# Patient Record
Sex: FEMALE | Race: ASIAN | NOT HISPANIC OR LATINO | URBAN - METROPOLITAN AREA
[De-identification: names, ages, dates, MRNs, and addresses within clinical notes are randomized per-mention and may not be internally consistent; named-entity substitution may affect disease eponyms.]

---

## 2018-11-14 ENCOUNTER — INPATIENT (INPATIENT)
Facility: HOSPITAL | Age: 73
LOS: 4 days | Discharge: HOME | End: 2018-11-19
Attending: INTERNAL MEDICINE | Admitting: INTERNAL MEDICINE
Payer: MEDICARE

## 2018-11-14 VITALS
HEART RATE: 98 BPM | RESPIRATION RATE: 18 BRPM | TEMPERATURE: 98 F | WEIGHT: 99.21 LBS | SYSTOLIC BLOOD PRESSURE: 151 MMHG | DIASTOLIC BLOOD PRESSURE: 97 MMHG | OXYGEN SATURATION: 90 %

## 2018-11-14 DIAGNOSIS — I50.811 ACUTE RIGHT HEART FAILURE: ICD-10-CM

## 2018-11-14 DIAGNOSIS — I21.4 NON-ST ELEVATION (NSTEMI) MYOCARDIAL INFARCTION: ICD-10-CM

## 2018-11-14 DIAGNOSIS — R63.4 ABNORMAL WEIGHT LOSS: ICD-10-CM

## 2018-11-14 DIAGNOSIS — E78.2 MIXED HYPERLIPIDEMIA: ICD-10-CM

## 2018-11-14 DIAGNOSIS — E03.9 HYPOTHYROIDISM, UNSPECIFIED: ICD-10-CM

## 2018-11-14 DIAGNOSIS — I10 ESSENTIAL (PRIMARY) HYPERTENSION: ICD-10-CM

## 2018-11-14 DIAGNOSIS — Z29.9 ENCOUNTER FOR PROPHYLACTIC MEASURES, UNSPECIFIED: ICD-10-CM

## 2018-11-14 LAB
ALBUMIN SERPL ELPH-MCNC: 2.8 G/DL — LOW (ref 3.3–5)
ALP SERPL-CCNC: 151 U/L — HIGH (ref 40–120)
ALT FLD-CCNC: 34 U/L — HIGH (ref 4–33)
APTT BLD: 34.4 SEC — SIGNIFICANT CHANGE UP (ref 27.5–36.3)
APTT BLD: 58.4 SEC — HIGH (ref 27.5–36.3)
AST SERPL-CCNC: 50 U/L — HIGH (ref 4–32)
BASE EXCESS BLDV CALC-SCNC: -1.4 MMOL/L — SIGNIFICANT CHANGE UP
BASE EXCESS BLDV CALC-SCNC: 0 MMOL/L — SIGNIFICANT CHANGE UP
BASOPHILS # BLD AUTO: 0.02 K/UL — SIGNIFICANT CHANGE UP (ref 0–0.2)
BASOPHILS NFR BLD AUTO: 0.3 % — SIGNIFICANT CHANGE UP (ref 0–2)
BILIRUB SERPL-MCNC: 0.4 MG/DL — SIGNIFICANT CHANGE UP (ref 0.2–1.2)
BLOOD GAS VENOUS - CREATININE: 0.65 MG/DL — SIGNIFICANT CHANGE UP (ref 0.5–1.3)
BLOOD GAS VENOUS - CREATININE: 0.69 MG/DL — SIGNIFICANT CHANGE UP (ref 0.5–1.3)
BUN SERPL-MCNC: 26 MG/DL — HIGH (ref 7–23)
CALCIUM SERPL-MCNC: 8.8 MG/DL — SIGNIFICANT CHANGE UP (ref 8.4–10.5)
CHLORIDE BLDV-SCNC: 108 MMOL/L — SIGNIFICANT CHANGE UP (ref 96–108)
CHLORIDE BLDV-SCNC: 109 MMOL/L — HIGH (ref 96–108)
CHLORIDE SERPL-SCNC: 101 MMOL/L — SIGNIFICANT CHANGE UP (ref 98–107)
CO2 SERPL-SCNC: 21 MMOL/L — LOW (ref 22–31)
CREAT SERPL-MCNC: 0.77 MG/DL — SIGNIFICANT CHANGE UP (ref 0.5–1.3)
D DIMER BLD IA.RAPID-MCNC: 3347 NG/ML — SIGNIFICANT CHANGE UP
EOSINOPHIL # BLD AUTO: 0.06 K/UL — SIGNIFICANT CHANGE UP (ref 0–0.5)
EOSINOPHIL NFR BLD AUTO: 0.8 % — SIGNIFICANT CHANGE UP (ref 0–6)
GAS PNL BLDV: 128 MMOL/L — LOW (ref 136–146)
GAS PNL BLDV: 130 MMOL/L — LOW (ref 136–146)
GLUCOSE BLDV-MCNC: 70 — SIGNIFICANT CHANGE UP (ref 70–99)
GLUCOSE BLDV-MCNC: 81 — SIGNIFICANT CHANGE UP (ref 70–99)
GLUCOSE SERPL-MCNC: 72 MG/DL — SIGNIFICANT CHANGE UP (ref 70–99)
HCO3 BLDV-SCNC: 22 MMOL/L — SIGNIFICANT CHANGE UP (ref 20–27)
HCO3 BLDV-SCNC: 24 MMOL/L — SIGNIFICANT CHANGE UP (ref 20–27)
HCT VFR BLD CALC: 35.6 % — SIGNIFICANT CHANGE UP (ref 34.5–45)
HCT VFR BLD CALC: 36.1 % — SIGNIFICANT CHANGE UP (ref 34.5–45)
HCT VFR BLDV CALC: 32.6 % — LOW (ref 34.5–45)
HCT VFR BLDV CALC: 35.4 % — SIGNIFICANT CHANGE UP (ref 34.5–45)
HGB BLD-MCNC: 11 G/DL — LOW (ref 11.5–15.5)
HGB BLD-MCNC: 11 G/DL — LOW (ref 11.5–15.5)
HGB BLDV-MCNC: 10.5 G/DL — LOW (ref 11.5–15.5)
HGB BLDV-MCNC: 11.5 G/DL — SIGNIFICANT CHANGE UP (ref 11.5–15.5)
IMM GRANULOCYTES # BLD AUTO: 0.03 # — SIGNIFICANT CHANGE UP
IMM GRANULOCYTES NFR BLD AUTO: 0.4 % — SIGNIFICANT CHANGE UP (ref 0–1.5)
INR BLD: 1.12 — SIGNIFICANT CHANGE UP (ref 0.88–1.17)
INR BLD: 1.18 — HIGH (ref 0.88–1.17)
LACTATE BLDV-MCNC: 1.6 MMOL/L — SIGNIFICANT CHANGE UP (ref 0.5–2)
LACTATE BLDV-MCNC: 3.7 MMOL/L — HIGH (ref 0.5–2)
LYMPHOCYTES # BLD AUTO: 1.52 K/UL — SIGNIFICANT CHANGE UP (ref 1–3.3)
LYMPHOCYTES # BLD AUTO: 20.1 % — SIGNIFICANT CHANGE UP (ref 13–44)
MCHC RBC-ENTMCNC: 26.6 PG — LOW (ref 27–34)
MCHC RBC-ENTMCNC: 27 PG — SIGNIFICANT CHANGE UP (ref 27–34)
MCHC RBC-ENTMCNC: 30.5 % — LOW (ref 32–36)
MCHC RBC-ENTMCNC: 30.9 % — LOW (ref 32–36)
MCV RBC AUTO: 86.2 FL — SIGNIFICANT CHANGE UP (ref 80–100)
MCV RBC AUTO: 88.7 FL — SIGNIFICANT CHANGE UP (ref 80–100)
MONOCYTES # BLD AUTO: 0.74 K/UL — SIGNIFICANT CHANGE UP (ref 0–0.9)
MONOCYTES NFR BLD AUTO: 9.8 % — SIGNIFICANT CHANGE UP (ref 2–14)
NEUTROPHILS # BLD AUTO: 5.19 K/UL — SIGNIFICANT CHANGE UP (ref 1.8–7.4)
NEUTROPHILS NFR BLD AUTO: 68.6 % — SIGNIFICANT CHANGE UP (ref 43–77)
NRBC # FLD: 0 — SIGNIFICANT CHANGE UP
NRBC # FLD: 0 — SIGNIFICANT CHANGE UP
NT-PROBNP SERPL-SCNC: SIGNIFICANT CHANGE UP PG/ML
PCO2 BLDV: 39 MMHG — LOW (ref 41–51)
PCO2 BLDV: 45 MMHG — SIGNIFICANT CHANGE UP (ref 41–51)
PH BLDV: 7.34 PH — SIGNIFICANT CHANGE UP (ref 7.32–7.43)
PH BLDV: 7.41 PH — SIGNIFICANT CHANGE UP (ref 7.32–7.43)
PLATELET # BLD AUTO: 247 K/UL — SIGNIFICANT CHANGE UP (ref 150–400)
PLATELET # BLD AUTO: 253 K/UL — SIGNIFICANT CHANGE UP (ref 150–400)
PMV BLD: 11.6 FL — SIGNIFICANT CHANGE UP (ref 7–13)
PMV BLD: 12 FL — SIGNIFICANT CHANGE UP (ref 7–13)
PO2 BLDV: 24 MMHG — LOW (ref 35–40)
PO2 BLDV: 28 MMHG — LOW (ref 35–40)
POTASSIUM BLDV-SCNC: 4.2 MMOL/L — SIGNIFICANT CHANGE UP (ref 3.4–4.5)
POTASSIUM BLDV-SCNC: 5.8 MMOL/L — HIGH (ref 3.4–4.5)
POTASSIUM SERPL-MCNC: 4.3 MMOL/L — SIGNIFICANT CHANGE UP (ref 3.5–5.3)
POTASSIUM SERPL-SCNC: 4.3 MMOL/L — SIGNIFICANT CHANGE UP (ref 3.5–5.3)
PROT SERPL-MCNC: 9.5 G/DL — HIGH (ref 6–8.3)
PROTHROM AB SERPL-ACNC: 12.8 SEC — SIGNIFICANT CHANGE UP (ref 9.8–13.1)
PROTHROM AB SERPL-ACNC: 13.2 SEC — HIGH (ref 9.8–13.1)
RBC # BLD: 4.07 M/UL — SIGNIFICANT CHANGE UP (ref 3.8–5.2)
RBC # BLD: 4.13 M/UL — SIGNIFICANT CHANGE UP (ref 3.8–5.2)
RBC # FLD: 16 % — HIGH (ref 10.3–14.5)
RBC # FLD: 16.1 % — HIGH (ref 10.3–14.5)
SAO2 % BLDV: 25.2 % — LOW (ref 60–85)
SAO2 % BLDV: 39.6 % — LOW (ref 60–85)
SODIUM SERPL-SCNC: 132 MMOL/L — LOW (ref 135–145)
TROPONIN T, HIGH SENSITIVITY: 191 NG/L — CRITICAL HIGH (ref ?–14)
TROPONIN T, HIGH SENSITIVITY: 212 NG/L — CRITICAL HIGH (ref ?–14)
WBC # BLD: 5.83 K/UL — SIGNIFICANT CHANGE UP (ref 3.8–10.5)
WBC # BLD: 7.56 K/UL — SIGNIFICANT CHANGE UP (ref 3.8–10.5)
WBC # FLD AUTO: 5.83 K/UL — SIGNIFICANT CHANGE UP (ref 3.8–10.5)
WBC # FLD AUTO: 7.56 K/UL — SIGNIFICANT CHANGE UP (ref 3.8–10.5)

## 2018-11-14 PROCEDURE — 71275 CT ANGIOGRAPHY CHEST: CPT | Mod: 26

## 2018-11-14 PROCEDURE — 71046 X-RAY EXAM CHEST 2 VIEWS: CPT | Mod: 26

## 2018-11-14 RX ORDER — METOPROLOL TARTRATE 50 MG
25 TABLET ORAL DAILY
Qty: 0 | Refills: 0 | Status: DISCONTINUED | OUTPATIENT
Start: 2018-11-14 | End: 2018-11-14

## 2018-11-14 RX ORDER — CLOPIDOGREL BISULFATE 75 MG/1
75 TABLET, FILM COATED ORAL DAILY
Qty: 0 | Refills: 0 | Status: DISCONTINUED | OUTPATIENT
Start: 2018-11-14 | End: 2018-11-19

## 2018-11-14 RX ORDER — LEVOTHYROXINE SODIUM 125 MCG
137 TABLET ORAL DAILY
Qty: 0 | Refills: 0 | Status: DISCONTINUED | OUTPATIENT
Start: 2018-11-14 | End: 2018-11-19

## 2018-11-14 RX ORDER — ASPIRIN/CALCIUM CARB/MAGNESIUM 324 MG
325 TABLET ORAL ONCE
Qty: 0 | Refills: 0 | Status: COMPLETED | OUTPATIENT
Start: 2018-11-14 | End: 2018-11-14

## 2018-11-14 RX ORDER — FUROSEMIDE 40 MG
20 TABLET ORAL EVERY 12 HOURS
Qty: 0 | Refills: 0 | Status: DISCONTINUED | OUTPATIENT
Start: 2018-11-14 | End: 2018-11-15

## 2018-11-14 RX ORDER — FERROUS GLUCONATE 100 %
1 POWDER (GRAM) MISCELLANEOUS
Qty: 0 | Refills: 0 | COMMUNITY

## 2018-11-14 RX ORDER — HEPARIN SODIUM 5000 [USP'U]/ML
2700 INJECTION INTRAVENOUS; SUBCUTANEOUS EVERY 6 HOURS
Qty: 0 | Refills: 0 | Status: DISCONTINUED | OUTPATIENT
Start: 2018-11-14 | End: 2018-11-15

## 2018-11-14 RX ORDER — METOPROLOL TARTRATE 50 MG
25 TABLET ORAL
Qty: 0 | Refills: 0 | Status: DISCONTINUED | OUTPATIENT
Start: 2018-11-14 | End: 2018-11-19

## 2018-11-14 RX ORDER — HEPARIN SODIUM 5000 [USP'U]/ML
INJECTION INTRAVENOUS; SUBCUTANEOUS
Qty: 25000 | Refills: 0 | Status: DISCONTINUED | OUTPATIENT
Start: 2018-11-14 | End: 2018-11-15

## 2018-11-14 RX ORDER — SIMVASTATIN 20 MG/1
40 TABLET, FILM COATED ORAL AT BEDTIME
Qty: 0 | Refills: 0 | Status: DISCONTINUED | OUTPATIENT
Start: 2018-11-14 | End: 2018-11-19

## 2018-11-14 RX ORDER — ASPIRIN/CALCIUM CARB/MAGNESIUM 324 MG
81 TABLET ORAL DAILY
Qty: 0 | Refills: 0 | Status: DISCONTINUED | OUTPATIENT
Start: 2018-11-14 | End: 2018-11-19

## 2018-11-14 RX ORDER — METOPROLOL TARTRATE 50 MG
50 TABLET ORAL
Qty: 0 | Refills: 0 | Status: DISCONTINUED | OUTPATIENT
Start: 2018-11-14 | End: 2018-11-14

## 2018-11-14 RX ORDER — HEPARIN SODIUM 5000 [USP'U]/ML
2700 INJECTION INTRAVENOUS; SUBCUTANEOUS ONCE
Qty: 0 | Refills: 0 | Status: COMPLETED | OUTPATIENT
Start: 2018-11-14 | End: 2018-11-14

## 2018-11-14 RX ORDER — FERROUS SULFATE 325(65) MG
325 TABLET ORAL THREE TIMES A DAY
Qty: 0 | Refills: 0 | Status: DISCONTINUED | OUTPATIENT
Start: 2018-11-14 | End: 2018-11-19

## 2018-11-14 RX ADMIN — HEPARIN SODIUM 500 UNIT(S)/HR: 5000 INJECTION INTRAVENOUS; SUBCUTANEOUS at 22:01

## 2018-11-14 RX ADMIN — HEPARIN SODIUM 2700 UNIT(S): 5000 INJECTION INTRAVENOUS; SUBCUTANEOUS at 15:07

## 2018-11-14 RX ADMIN — Medication 325 MILLIGRAM(S): at 23:42

## 2018-11-14 RX ADMIN — Medication 20 MILLIGRAM(S): at 23:42

## 2018-11-14 RX ADMIN — HEPARIN SODIUM 500 UNIT(S)/HR: 5000 INJECTION INTRAVENOUS; SUBCUTANEOUS at 15:06

## 2018-11-14 RX ADMIN — Medication 325 MILLIGRAM(S): at 15:05

## 2018-11-14 RX ADMIN — SIMVASTATIN 40 MILLIGRAM(S): 20 TABLET, FILM COATED ORAL at 23:42

## 2018-11-14 NOTE — H&P ADULT - PMH
CAD (coronary artery disease)    HTN (hypertension)    Hypothyroidism, unspecified type    Mixed hyperlipidemia

## 2018-11-14 NOTE — ED ADULT NURSE REASSESSMENT NOTE - NS ED NURSE REASSESS COMMENT FT1
Repeat labs drawn and sent by Lorene MCCORMICK.  Pt vitals as noted.  Pt reports 0/10 pain.  Pt NSR on cardiac monitor.  Pt transported upstairs.

## 2018-11-14 NOTE — ED PROVIDER NOTE - CARE PLAN
Principal Discharge DX:	NSTEMI (non-ST elevated myocardial infarction)  Secondary Diagnosis:	Right heart failure

## 2018-11-14 NOTE — ED ADULT NURSE NOTE - OBJECTIVE STATEMENT
Patient presented to ED A&O x4, with c/o SOB, decreased PO intake with weight loss 0f 30 lbs in 6 months. (+) CP/epigastric pain 1 week ago. Patient is s/p cardiac stent 2 weeks ago. EKG done and cardiac monitor in place.  Blood work drawn and sent to lab. ER physician evaluated patient.  Will continue to monitor patient closely. Hill NICOLE

## 2018-11-14 NOTE — ED PROVIDER NOTE - OBJECTIVE STATEMENT
73 F with hx of stent placement x 1 1month ago, ? thyroid disease, and HTN presents to ED with chest pain x 3 days that is midline, substernal , intermittent and worse on exertion. CP is associated with shortness of breath since this morning prompting ED visit. She states that she has had dry cough x a few days and 35 lb weight loss over last 11 months for which she is being evaluated as an outpatient .Per her PMD , it was suspected to be due to thyroid disease so was started on ? thyroid medicine and had an ultrasound of her abdomen performed. She denies hx of dvt, PE. Admits to sensation of difficulty swallowing

## 2018-11-14 NOTE — H&P ADULT - HISTORY OF PRESENT ILLNESS
72 y/o F w/ PMH of HTN, Hypothyroidism, HLD, and CAD (s/p stent x2 10/5/18) presenting w/ worsening chest pain x3 days. Patient states she had a holter monitor placed by her doctor and she was going to return the monitor today (11/14) when she suddenly started getting chest pain and dizziness. She states the chest pain was 8/10, nonradiating, and it was a sharp pain. She also had some shortness of breath and cough but she was more affected by the dizziness than she was by the dyspnea or cough. She denies any fever, chills, recent illness, sputum production, diaphoresis, N/V/D, hematuria, changes in bowel habits, or peripheral edema. Pt also reports a 35lb weight loss over 11 months for which she was evaluated for outpatient. She thinks it has something to do with her thyroid, however, she was just started on levothyroxine 1 month ago and is not aware of any further workups she has had.

## 2018-11-14 NOTE — ED ADULT NURSE NOTE - NSIMPLEMENTINTERV_GEN_ALL_ED
Implemented All Fall with Harm Risk Interventions:  [place] to call system. Call bell, personal items and telephone within reach. Instruct patient to call for assistance. Room bathroom lighting operational. Non-slip footwear when patient is off stretcher. Physically safe environment: no spills, clutter or unnecessary equipment. Stretcher in lowest position, wheels locked, appropriate side rails in place. Provide visual cue, wrist band, yellow gown, etc. Monitor gait and stability. Monitor for mental status changes and reorient to person, place, and time. Review medications for side effects contributing to fall risk. Reinforce activity limits and safety measures with patient and family. Provide visual clues: red socks.

## 2018-11-14 NOTE — H&P ADULT - ATTENDING COMMENTS
Patient seen and examined.  Agree with above.   -admitted with NSTEMI  -currently chest pain free  -continue with hep gtt  -cath today    Carolina Ortega MD

## 2018-11-14 NOTE — H&P ADULT - PROBLEM SELECTOR PLAN 1
loading dose  Heparin gtt  Admit to telemetry, serial cardiac enzymes, serial EKGs  Check CBC, CMP, TSH, FLP, HgA1C  Echo ordered  F/U MD note

## 2018-11-14 NOTE — H&P ADULT - ASSESSMENT
74 y/o F w/ PMH of HTN, Hypothyroidism, HLD, and CAD (s/p stent x2 10/5/18) presenting w/ worsening chest pain x3 days. Pt also reports a 35lb weight loss over 11 months.    +NSTEMI on heparin gtt  +CHF on IV lasix 20  +35lb Wt loss

## 2018-11-14 NOTE — ED PROVIDER NOTE - ATTENDING CONTRIBUTION TO CARE
I agree with the above H&P.  Briefly this is a 73 year old with recent stant placement presents with sob and chest pain X 4 days. pain is exertional.  concern for acs vs in stent rethrombosis vs pna vs pe. will checj labs ekg and trops.  ddimer and cta if positive. likely admit

## 2018-11-14 NOTE — H&P ADULT - NSHPLABSRESULTS_GEN_ALL_CORE
11.0   5.83  )-----------( 247      ( 14 Nov 2018 20:47 )             35.6   11-14    132<L>  |  101  |  26<H>  ----------------------------<  72  4.3   |  21<L>  |  0.77    Ca    8.8      14 Nov 2018 12:45    TPro  9.5<H>  /  Alb  2.8<L>  /  TBili  0.4  /  DBili  x   /  AST  50<H>  /  ALT  34<H>  /  AlkPhos  151<H>  11-14    Trop 191-->212  BNP-->31901  DDimer: [id number]  CTA: No evidence of PE; 6mm R upper lobe nodule; severely dilated R atrium w/ evidence of R sided heart failure; mild pulmonary edema  CXR: Small left pleural effusion with adjacent left basilar patchy airspace consolidation including possible infiltrate/pneumonia in the proper clinical context. Sharp right CP angle. Clear remaining visualized lungs. No pneumothorax. Enlarged appearing cardiac and mediastinal silhouettes. Scant aortic arch calcifications. Trachea midline. Slightly osteopenic but otherwise unremarkable osseous structures. 11.0   5.83  )-----------( 247      ( 14 Nov 2018 20:47 )             35.6   11-14    132<L>  |  101  |  26<H>  ----------------------------<  72  4.3   |  21<L>  |  0.77    Ca    8.8      14 Nov 2018 12:45    TPro  9.5<H>  /  Alb  2.8<L>  /  TBili  0.4  /  DBili  x   /  AST  50<H>  /  ALT  34<H>  /  AlkPhos  151<H>  11-14    Trop 191-->212  BNP-->89813  DDimer: [id number]  EKG: Sinus @91 BPM w/ TWI III, avf, V3-V6  CTA: No evidence of PE; 6mm R upper lobe nodule; severely dilated R atrium w/ evidence of R sided heart failure; mild pulmonary edema  CXR: Small left pleural effusion with adjacent left basilar patchy airspace consolidation including possible infiltrate/pneumonia in the proper clinical context. Sharp right CP angle. Clear remaining visualized lungs. No pneumothorax. Enlarged appearing cardiac and mediastinal silhouettes. Scant aortic arch calcifications. Trachea midline. Slightly osteopenic but otherwise unremarkable osseous structures.

## 2018-11-14 NOTE — ED ADULT TRIAGE NOTE - CHIEF COMPLAINT QUOTE
Pt c/o SOB since yesterday that has worsened with CP and dizziness that worsens with exertion. Pt had 2 stents placed in October 5th. Appears SOB.

## 2018-11-14 NOTE — ED PROVIDER NOTE - MEDICAL DECISION MAKING DETAILS
72 y/o F with HTN, HLD, CAD presenting with exertional dyspnea and chest pain with some difficulty swallowing intermittently. Considering ACS vs PE vs effusion vs malignancy vs mass . will obtain ECG, blood work and CT chest. likely admit tele

## 2018-11-15 LAB
ALBUMIN SERPL ELPH-MCNC: 2.8 G/DL — LOW (ref 3.3–5)
ALP SERPL-CCNC: 149 U/L — HIGH (ref 40–120)
ALT FLD-CCNC: 33 U/L — SIGNIFICANT CHANGE UP (ref 4–33)
APTT BLD: 49.2 SEC — HIGH (ref 27.5–36.3)
APTT BLD: 65.2 SEC — HIGH (ref 27.5–36.3)
AST SERPL-CCNC: 50 U/L — HIGH (ref 4–32)
BILIRUB SERPL-MCNC: 0.5 MG/DL — SIGNIFICANT CHANGE UP (ref 0.2–1.2)
BUN SERPL-MCNC: 32 MG/DL — HIGH (ref 7–23)
CALCIUM SERPL-MCNC: 8.7 MG/DL — SIGNIFICANT CHANGE UP (ref 8.4–10.5)
CHLORIDE SERPL-SCNC: 101 MMOL/L — SIGNIFICANT CHANGE UP (ref 98–107)
CHOLEST SERPL-MCNC: 100 MG/DL — LOW (ref 120–199)
CK MB BLD-MCNC: 14.5 NG/ML — HIGH (ref 1–4.7)
CK MB BLD-MCNC: 18.83 NG/ML — HIGH (ref 1–4.7)
CK MB BLD-MCNC: 8 — HIGH (ref 0–2.5)
CK MB BLD-MCNC: 8.9 — HIGH (ref 0–2.5)
CK SERPL-CCNC: 181 U/L — HIGH (ref 25–170)
CK SERPL-CCNC: 211 U/L — HIGH (ref 25–170)
CO2 SERPL-SCNC: 20 MMOL/L — LOW (ref 22–31)
CREAT SERPL-MCNC: 0.94 MG/DL — SIGNIFICANT CHANGE UP (ref 0.5–1.3)
GLUCOSE SERPL-MCNC: 93 MG/DL — SIGNIFICANT CHANGE UP (ref 70–99)
HBA1C BLD-MCNC: 5.8 % — HIGH (ref 4–5.6)
HCT VFR BLD CALC: 37 % — SIGNIFICANT CHANGE UP (ref 34.5–45)
HDLC SERPL-MCNC: 28 MG/DL — LOW (ref 45–65)
HGB BLD-MCNC: 11.6 G/DL — SIGNIFICANT CHANGE UP (ref 11.5–15.5)
INR BLD: 1.18 — HIGH (ref 0.88–1.17)
LIPID PNL WITH DIRECT LDL SERPL: 62 MG/DL — SIGNIFICANT CHANGE UP
MAGNESIUM SERPL-MCNC: 1.8 MG/DL — SIGNIFICANT CHANGE UP (ref 1.6–2.6)
MCHC RBC-ENTMCNC: 26.8 PG — LOW (ref 27–34)
MCHC RBC-ENTMCNC: 31.4 % — LOW (ref 32–36)
MCV RBC AUTO: 85.5 FL — SIGNIFICANT CHANGE UP (ref 80–100)
NRBC # FLD: 0 — SIGNIFICANT CHANGE UP
PLATELET # BLD AUTO: 264 K/UL — SIGNIFICANT CHANGE UP (ref 150–400)
PMV BLD: 12.1 FL — SIGNIFICANT CHANGE UP (ref 7–13)
POTASSIUM SERPL-MCNC: 4.4 MMOL/L — SIGNIFICANT CHANGE UP (ref 3.5–5.3)
POTASSIUM SERPL-SCNC: 4.4 MMOL/L — SIGNIFICANT CHANGE UP (ref 3.5–5.3)
PROT SERPL-MCNC: 9.3 G/DL — HIGH (ref 6–8.3)
PROTHROM AB SERPL-ACNC: 13.5 SEC — HIGH (ref 9.8–13.1)
RBC # BLD: 4.33 M/UL — SIGNIFICANT CHANGE UP (ref 3.8–5.2)
RBC # FLD: 15.9 % — HIGH (ref 10.3–14.5)
SODIUM SERPL-SCNC: 133 MMOL/L — LOW (ref 135–145)
TRIGL SERPL-MCNC: 95 MG/DL — SIGNIFICANT CHANGE UP (ref 10–149)
TROPONIN T, HIGH SENSITIVITY: 246 NG/L — CRITICAL HIGH (ref ?–14)
TSH SERPL-MCNC: 1.41 UIU/ML — SIGNIFICANT CHANGE UP (ref 0.27–4.2)
WBC # BLD: 5.81 K/UL — SIGNIFICANT CHANGE UP (ref 3.8–10.5)
WBC # FLD AUTO: 5.81 K/UL — SIGNIFICANT CHANGE UP (ref 3.8–10.5)

## 2018-11-15 PROCEDURE — 93306 TTE W/DOPPLER COMPLETE: CPT | Mod: 26

## 2018-11-15 PROCEDURE — 93458 L HRT ARTERY/VENTRICLE ANGIO: CPT | Mod: 26

## 2018-11-15 RX ORDER — INFLUENZA VIRUS VACCINE 15; 15; 15; 15 UG/.5ML; UG/.5ML; UG/.5ML; UG/.5ML
0.5 SUSPENSION INTRAMUSCULAR ONCE
Qty: 0 | Refills: 0 | Status: DISCONTINUED | OUTPATIENT
Start: 2018-11-15 | End: 2018-11-19

## 2018-11-15 RX ORDER — BENZOCAINE AND MENTHOL 5; 1 G/100ML; G/100ML
1 LIQUID ORAL
Qty: 0 | Refills: 0 | Status: DISCONTINUED | OUTPATIENT
Start: 2018-11-15 | End: 2018-11-19

## 2018-11-15 RX ADMIN — Medication 81 MILLIGRAM(S): at 14:33

## 2018-11-15 RX ADMIN — Medication 325 MILLIGRAM(S): at 14:33

## 2018-11-15 RX ADMIN — HEPARIN SODIUM 600 UNIT(S)/HR: 5000 INJECTION INTRAVENOUS; SUBCUTANEOUS at 10:33

## 2018-11-15 RX ADMIN — SIMVASTATIN 40 MILLIGRAM(S): 20 TABLET, FILM COATED ORAL at 22:15

## 2018-11-15 RX ADMIN — Medication 325 MILLIGRAM(S): at 22:15

## 2018-11-15 RX ADMIN — Medication 137 MICROGRAM(S): at 05:08

## 2018-11-15 RX ADMIN — Medication 25 MILLIGRAM(S): at 05:08

## 2018-11-15 RX ADMIN — Medication 20 MILLIGRAM(S): at 05:08

## 2018-11-15 RX ADMIN — Medication 25 MILLIGRAM(S): at 22:15

## 2018-11-15 RX ADMIN — HEPARIN SODIUM 600 UNIT(S)/HR: 5000 INJECTION INTRAVENOUS; SUBCUTANEOUS at 04:34

## 2018-11-15 RX ADMIN — Medication 325 MILLIGRAM(S): at 05:08

## 2018-11-15 RX ADMIN — CLOPIDOGREL BISULFATE 75 MILLIGRAM(S): 75 TABLET, FILM COATED ORAL at 14:33

## 2018-11-15 NOTE — CONSULT NOTE ADULT - SUBJECTIVE AND OBJECTIVE BOX
Patient is a 73y old  Female who presents with a chief complaint of Chest pain (14 Nov 2018 21:08)      HPI:  72 y/o F w/ PMH of HTN, Hypothyroidism, HLD, and CAD (s/p stent x2 10/5/18) presenting w/ worsening chest pain x3 days. Patient states she had a holter monitor placed by her doctor and she was going to return the monitor today (11/14) when she suddenly started getting chest pain and dizziness. She states the chest pain was 8/10, nonradiating, and it was a sharp pain. She also had some shortness of breath and cough but she was more affected by the dizziness than she was by the dyspnea or cough. She denies any fever, chills, recent illness, sputum production, diaphoresis, N/V/D, hematuria, changes in bowel habits, or peripheral edema. Pt also reports a 35lb weight loss over 11 months for which she was evaluated for outpatient. She thinks it has something to do with her thyroid, however, she was just started on levothyroxine 1 month ago and is not aware of any further workups she has had. (14 Nov 2018 21:08)      PAST MEDICAL & SURGICAL HISTORY:  Hypothyroidism, unspecified type  Mixed hyperlipidemia  CAD (coronary artery disease)  HTN (hypertension)      Review of Systems:   CONSTITUTIONAL: No fever, weight loss, or fatigue  EYES: No eye pain, visual disturbances, or discharge  ENMT:  No difficulty hearing, tinnitus, vertigo; No sinus or throat pain  NECK: No pain or stiffness  RESPIRATORY: No cough, wheezing, chills or hemoptysis; No shortness of breath  CARDIOVASCULAR: see above HPI no  dizziness, or leg swelling  GASTROINTESTINAL: No abdominal or epigastric pain. No nausea, vomiting, or hematemesis; No diarrhea or constipation. No melena or hematochezia.  GENITOURINARY: No dysuria, frequency, hematuria, or incontinence  NEUROLOGICAL: No headaches, memory loss, loss of strength, numbness, or tremors  SKIN: No itching, burning, rashes, or lesions   LYMPH NODES: No enlarged glands  ENDOCRINE: No heat or cold intolerance; No hair loss  MUSCULOSKELETAL: No joint pain or swelling; No muscle, back, or extremity pain  PSYCHIATRIC: No depression, anxiety, mood swings, or difficulty sleeping  HEME/LYMPH: No easy bruising, or bleeding gums  ALLERGY AND IMMUNOLOGIC: No hives or eczema    Allergies    No Known Allergies    Social History: non smoker  no IVDA  no ETOH abuse   lives with family      FAMILY HISTORY:      MEDICATIONS  (STANDING):  aspirin  chewable 81 milliGRAM(s) Oral daily  clopidogrel Tablet 75 milliGRAM(s) Oral daily  ferrous    sulfate 325 milliGRAM(s) Oral three times a day  furosemide   Injectable 20 milliGRAM(s) IV Push every 12 hours  influenza   Vaccine 0.5 milliLiter(s) IntraMuscular once  levothyroxine 137 MICROGram(s) Oral daily  metoprolol tartrate 25 milliGRAM(s) Oral two times a day  simvastatin 40 milliGRAM(s) Oral at bedtime    MEDICATIONS  (PRN):  benzocaine 15 mG/menthol 3.6 mG (Sugar-Free) Lozenge 1 Lozenge Oral every 2 hours PRN Sore Throat      CAPILLARY BLOOD GLUCOSE        I&O's Summary    14 Nov 2018 07:01  -  15 Nov 2018 07:00  --------------------------------------------------------  IN: 542 mL / OUT: 600 mL / NET: -58 mL        PHYSICAL EXAM:  GENERAL: NAD, well-developed  HEAD:  Atraumatic, Normocephalic  EYES: EOMI, PERRLA, conjunctiva and sclera clear  NECK: Supple, No JVD  CHEST/LUNG: Clear to auscultation bilaterally; No wheeze  HEART: S1S2; soft ejection systolic murmur best heard at left sternal border No rubs, or gallops  ABDOMEN: Soft, Nontender, Nondistended; Bowel sounds present  EXTREMITIES:  + Peripheral Pulses, No clubbing or cyanosis, no edema  PSYCH: AO x 3,   NEUROLOGY: Alert, no focal motor or sensory deficits  SKIN: No rashes or lesions    LABS:                        11.6   5.81  )-----------( 264      ( 15 Nov 2018 03:30 )             37.0     11-15    133<L>  |  101  |  32<H>  ----------------------------<  93  4.4   |  20<L>  |  0.94    Ca    8.7      15 Nov 2018 03:30  Mg     1.8     11-15    TPro  9.3<H>  /  Alb  2.8<L>  /  TBili  0.5  /  DBili  x   /  AST  50<H>  /  ALT  33  /  AlkPhos  149<H>  11-15    PT/INR - ( 15 Nov 2018 03:30 )   PT: 13.5 SEC;   INR: 1.18          PTT - ( 15 Nov 2018 10:08 )  PTT:65.2 SEC  CARDIAC MARKERS ( 15 Nov 2018 03:30 )  x     / x     / 181 u/L / 14.50 ng/mL / x      CARDIAC MARKERS ( 14 Nov 2018 17:00 )  x     / x     / 211 u/L / 18.83 ng/mL / x              RADIOLOGY & ADDITIONAL TESTS:    Consultant(s) Notes Reviewed:      Care Discussed with Consultants/Other Providers:

## 2018-11-15 NOTE — CONSULT NOTE ADULT - ASSESSMENT
74 y/o F w/ PMH of HTN, Hypothyroidism, HLD, and CAD (s/p stent x2 10/5/18) presenting w/ worsening chest pain x3 days. Pt also reports a 35lb weight loss over 11 months.

## 2018-11-15 NOTE — CHART NOTE - NSCHARTNOTEFT_GEN_A_CORE
NUTRITION SERVICES     Upon Nutritional Assessment by the Registered Dietitian your patient was determined to meet criteria/ has evidence of the following diagnosis/diagnoses:  [ ] Mild Protein Calorie Malnutrition   [ ] Moderate Protein Calorie Malnutrition   [X] Severe Protein Calorie Malnutrition   [ ] Unspecified Protein Calorie Malnutrition   [ ] Underweight / BMI <19  [ ] Morbid Obesity / BMI >40    Findings as based on:  •  Comprehensive nutrition assessment and consultation       Treatment:  The following diet has been recommended:

## 2018-11-15 NOTE — DIETITIAN INITIAL EVALUATION ADULT. - DIET TYPE
DASH/TLC (sodium and cholesterol restricted diet)/regular/low fat/consistent carbohydrate (no snacks)/1500ml/caffeine free

## 2018-11-15 NOTE — CHART NOTE - NSCHARTNOTEFT_GEN_A_CORE
73y Female s/p cardiac cath x today for right groin check. Pt currently denies any complaints.    Right groin site: Dressing in place c/d/i. No hematoma present. No edema/swelling/discoloration/coldness of extremity. Pulses and sensation intact.     Will cont to monitor

## 2018-11-15 NOTE — DIETITIAN INITIAL EVALUATION ADULT. - NS AS NUTRI INTERV MEALS SNACK
1. Suggest: PO diet rx: Regular, DASH/TLC (cholesterol and sodium restricted); Fluid Restriction per MD discretion; PO supplement: Ensure Enlive 8oz. 2x daily (will provide additional ~700 Kcal, ~40 gm Protein);            2. Encourage & assist Pt with meals; Monitor PO diet tolerance; Honor food preferences;               3. Monitor labs, weights, hydration status;/Diets modified for specific foods and ingredients/Other (specify)

## 2018-11-15 NOTE — DIETITIAN INITIAL EVALUATION ADULT. - PHYSICAL APPEARANCE
Nutrition focused physical exam conducted - found signs of malnutrition [ ] absent [X] present Subcutaneous fat loss: [moderate] Orbital fat pads region, [moderate] Buccal fat region; Muscle wasting: [moderate] Temples region, [moderate] Clavicle region [moderate] Shoulder region./underweight/debilitated/other (specify)

## 2018-11-15 NOTE — DIETITIAN INITIAL EVALUATION ADULT. - OTHER INFO
Nutrition Consult X Unintentional weight loss > 10%. Pt 72 yo female with Heart Failure. Pt appears alert, oriented. Per Pt her appetite usually not that good. Pt also stated her UBW: ~135#, she had weight loss of ~40# several months ago and her weight has been stable at ~95-96# in past few months. No chew/swallow problem voiced; no nausea/vomiting/diarrhea reported @ present. At home Pt eats Regular food reported. Pt's sister usually helps Pt with meal preparation reported as well. Food preferences discussed with Pt. Case discussed with Tele PA. RDN remains available.

## 2018-11-15 NOTE — CHART NOTE - NSCHARTNOTEFT_GEN_A_CORE
73 year old female with HTN HLD hypothyroidism CAD s/p 2 stents placed 10/5/18 at [facility] admitted with recurrent chest pain, dyspnea and diaphoresis.  She states she has been compliant with all her meds. She denies any palpitations, syncope or near syncope. On admit, CTA chest was performed that revealed no evidence of PE or AAA although there was evidence of pulmonary edema and right sided heart failure with hepatic congestion.  Troponins were elevated and CPK /CKMB added this morning were significantly elevated. Given her above symptoms and lab results, patient was placed on a hep gtt and  is pending cardiac cath today .      -- hold hep gtt on call to cath   -- check TTE to assess LV function   -- TSH WNL  -- patient with weight loss of 35 pounds over the last year - apparently outpt w/u negative  -- HD stable no evidence CHF  --  no tele events thus far  -- continue to monitor - final recs pending above

## 2018-11-16 DIAGNOSIS — J84.9 INTERSTITIAL PULMONARY DISEASE, UNSPECIFIED: ICD-10-CM

## 2018-11-16 DIAGNOSIS — R63.4 ABNORMAL WEIGHT LOSS: ICD-10-CM

## 2018-11-16 DIAGNOSIS — I27.20 PULMONARY HYPERTENSION, UNSPECIFIED: ICD-10-CM

## 2018-11-16 DIAGNOSIS — R91.1 SOLITARY PULMONARY NODULE: ICD-10-CM

## 2018-11-16 LAB
ALBUMIN SERPL ELPH-MCNC: 2.8 G/DL — LOW (ref 3.3–5)
ALP SERPL-CCNC: 124 U/L — HIGH (ref 40–120)
ALT FLD-CCNC: 29 U/L — SIGNIFICANT CHANGE UP (ref 4–33)
AST SERPL-CCNC: 45 U/L — HIGH (ref 4–32)
BASOPHILS # BLD AUTO: 0.04 K/UL — SIGNIFICANT CHANGE UP (ref 0–0.2)
BASOPHILS NFR BLD AUTO: 0.6 % — SIGNIFICANT CHANGE UP (ref 0–2)
BILIRUB SERPL-MCNC: 0.5 MG/DL — SIGNIFICANT CHANGE UP (ref 0.2–1.2)
BUN SERPL-MCNC: 39 MG/DL — HIGH (ref 7–23)
CALCIUM SERPL-MCNC: 8.7 MG/DL — SIGNIFICANT CHANGE UP (ref 8.4–10.5)
CHLORIDE SERPL-SCNC: 100 MMOL/L — SIGNIFICANT CHANGE UP (ref 98–107)
CO2 SERPL-SCNC: 21 MMOL/L — LOW (ref 22–31)
CREAT SERPL-MCNC: 1.04 MG/DL — SIGNIFICANT CHANGE UP (ref 0.5–1.3)
EOSINOPHIL # BLD AUTO: 0.05 K/UL — SIGNIFICANT CHANGE UP (ref 0–0.5)
EOSINOPHIL NFR BLD AUTO: 0.8 % — SIGNIFICANT CHANGE UP (ref 0–6)
GLUCOSE SERPL-MCNC: 87 MG/DL — SIGNIFICANT CHANGE UP (ref 70–99)
HCT VFR BLD CALC: 36.2 % — SIGNIFICANT CHANGE UP (ref 34.5–45)
HGB BLD-MCNC: 11.4 G/DL — LOW (ref 11.5–15.5)
IMM GRANULOCYTES # BLD AUTO: 0.02 # — SIGNIFICANT CHANGE UP
IMM GRANULOCYTES NFR BLD AUTO: 0.3 % — SIGNIFICANT CHANGE UP (ref 0–1.5)
LYMPHOCYTES # BLD AUTO: 1.39 K/UL — SIGNIFICANT CHANGE UP (ref 1–3.3)
LYMPHOCYTES # BLD AUTO: 22.2 % — SIGNIFICANT CHANGE UP (ref 13–44)
MAGNESIUM SERPL-MCNC: 1.9 MG/DL — SIGNIFICANT CHANGE UP (ref 1.6–2.6)
MCHC RBC-ENTMCNC: 26.8 PG — LOW (ref 27–34)
MCHC RBC-ENTMCNC: 31.5 % — LOW (ref 32–36)
MCV RBC AUTO: 85 FL — SIGNIFICANT CHANGE UP (ref 80–100)
MONOCYTES # BLD AUTO: 0.69 K/UL — SIGNIFICANT CHANGE UP (ref 0–0.9)
MONOCYTES NFR BLD AUTO: 11 % — SIGNIFICANT CHANGE UP (ref 2–14)
NEUTROPHILS # BLD AUTO: 4.07 K/UL — SIGNIFICANT CHANGE UP (ref 1.8–7.4)
NEUTROPHILS NFR BLD AUTO: 65.1 % — SIGNIFICANT CHANGE UP (ref 43–77)
NRBC # FLD: 0 — SIGNIFICANT CHANGE UP
PLATELET # BLD AUTO: 267 K/UL — SIGNIFICANT CHANGE UP (ref 150–400)
PMV BLD: 12 FL — SIGNIFICANT CHANGE UP (ref 7–13)
POTASSIUM SERPL-MCNC: 4.2 MMOL/L — SIGNIFICANT CHANGE UP (ref 3.5–5.3)
POTASSIUM SERPL-SCNC: 4.2 MMOL/L — SIGNIFICANT CHANGE UP (ref 3.5–5.3)
PROT SERPL-MCNC: 9.3 G/DL — HIGH (ref 6–8.3)
RBC # BLD: 4.26 M/UL — SIGNIFICANT CHANGE UP (ref 3.8–5.2)
RBC # FLD: 16.2 % — HIGH (ref 10.3–14.5)
SODIUM SERPL-SCNC: 132 MMOL/L — LOW (ref 135–145)
WBC # BLD: 6.26 K/UL — SIGNIFICANT CHANGE UP (ref 3.8–10.5)
WBC # FLD AUTO: 6.26 K/UL — SIGNIFICANT CHANGE UP (ref 3.8–10.5)

## 2018-11-16 RX ORDER — FUROSEMIDE 40 MG
20 TABLET ORAL DAILY
Qty: 0 | Refills: 0 | Status: DISCONTINUED | OUTPATIENT
Start: 2018-11-16 | End: 2018-11-19

## 2018-11-16 RX ORDER — DOCUSATE SODIUM 100 MG
100 CAPSULE ORAL THREE TIMES A DAY
Qty: 0 | Refills: 0 | Status: DISCONTINUED | OUTPATIENT
Start: 2018-11-16 | End: 2018-11-19

## 2018-11-16 RX ORDER — SENNA PLUS 8.6 MG/1
2 TABLET ORAL AT BEDTIME
Qty: 0 | Refills: 0 | Status: DISCONTINUED | OUTPATIENT
Start: 2018-11-16 | End: 2018-11-19

## 2018-11-16 RX ORDER — POLYETHYLENE GLYCOL 3350 17 G/17G
17 POWDER, FOR SOLUTION ORAL DAILY
Qty: 0 | Refills: 0 | Status: DISCONTINUED | OUTPATIENT
Start: 2018-11-16 | End: 2018-11-19

## 2018-11-16 RX ADMIN — Medication 325 MILLIGRAM(S): at 11:14

## 2018-11-16 RX ADMIN — Medication 325 MILLIGRAM(S): at 05:19

## 2018-11-16 RX ADMIN — Medication 100 MILLIGRAM(S): at 21:18

## 2018-11-16 RX ADMIN — Medication 25 MILLIGRAM(S): at 05:19

## 2018-11-16 RX ADMIN — Medication 81 MILLIGRAM(S): at 11:13

## 2018-11-16 RX ADMIN — Medication 100 MILLIGRAM(S): at 14:53

## 2018-11-16 RX ADMIN — Medication 325 MILLIGRAM(S): at 21:18

## 2018-11-16 RX ADMIN — CLOPIDOGREL BISULFATE 75 MILLIGRAM(S): 75 TABLET, FILM COATED ORAL at 11:13

## 2018-11-16 RX ADMIN — Medication 137 MICROGRAM(S): at 05:19

## 2018-11-16 RX ADMIN — SENNA PLUS 2 TABLET(S): 8.6 TABLET ORAL at 21:18

## 2018-11-16 RX ADMIN — SIMVASTATIN 40 MILLIGRAM(S): 20 TABLET, FILM COATED ORAL at 21:18

## 2018-11-16 RX ADMIN — Medication 25 MILLIGRAM(S): at 16:42

## 2018-11-16 NOTE — CONSULT NOTE ADULT - PROBLEM SELECTOR RECOMMENDATION 3
- suspect increase in LFTs d/t hepatic congestions 2/2 RHF  - further care per cardiology
continue to monitor  continue home meds with hold parameters
Has 6 mm RUL nodule in the face of no malignancies as well as non smoker: Outpt follow up

## 2018-11-16 NOTE — CONSULT NOTE ADULT - ATTENDING COMMENTS
Patient seen and examined.  Agree with above.   -admitted with NSTEMI  -currently chest pain free  -continue with hep gtt  -cath today    Carolina Ortega MD DW PMD

## 2018-11-16 NOTE — CONSULT NOTE ADULT - PROBLEM SELECTOR RECOMMENDATION 4
- pulmonary on board/appreciated
outpatient follow up with PCP  continue statin
for ct scan abdomen and pelvis

## 2018-11-16 NOTE — CONSULT NOTE ADULT - PROBLEM SELECTOR RECOMMENDATION 9
pt is found to have severe pulmonary hypertension for unknown reasons: She had NSTEMI on admission, currently chest pain free: SHE HAS SOME SCARRING AT HER LUNG BASES BUT DENIES HAVING ANY COLLAGEN VASCULAR DISEASE: WOULD ORDER BASIC WORKUP: She would need full workup with Full PFT as well as PSG to evaluate fully for pulm htn: She also may need vq scan. She does have moderate aortic stenosis!

## 2018-11-16 NOTE — CONSULT NOTE ADULT - PROBLEM SELECTOR RECOMMENDATION 2
- cardiology care appreciated  - s/p cath with medical management
coronary angiogram no acute intervention required at this time   chest pain free
per cardiology

## 2018-11-16 NOTE — PROGRESS NOTE ADULT - ATTENDING COMMENTS
Patient seen and examined, agree with above assessment and plan as transcribed above.    - Patent stents moderate AS  - Severe Pulm HTN ? ILD pulmonary f/u    Vidal Rojo MD, FACC

## 2018-11-16 NOTE — CONSULT NOTE ADULT - ASSESSMENT
72 yo F w/ PMH of HTN, Hypothyroidism, HLD, and CAD (s/p stent x2 10/5/18 on Plavix) presenting w/ worsening chest pain x3 days noted to have NSTEMI with right sided heart failure during admission and with interstitial lung dz on CTA Chest

## 2018-11-16 NOTE — CONSULT NOTE ADULT - SUBJECTIVE AND OBJECTIVE BOX
Chief Complaint:  Patient is a 73y old  Female who presents with a chief complaint of Chest pain (16 Nov 2018 12:38)    Hypothyroidism, unspecified type  Mixed hyperlipidemia  CAD (coronary artery disease)  HTN (hypertension)     HPI:  74 yo F w/ PMH of HTN, Hypothyroidism, HLD, and CAD (s/p stent x2 10/5/18 on Plavix) presenting w/ worsening chest pain x3 days. Patient states she had a holter monitor placed by her doctor and she was going to return the monitor today (11/14) when she suddenly started getting chest pain and dizziness. She states the chest pain was 8/10, nonradiating, and it was a sharp pain. She also had some shortness of breath and cough but she was more affected by the dizziness than she was by the dyspnea or cough. She denies any fever, chills, recent illness, sputum production, diaphoresis, N/V/D, hematuria, changes in bowel habits, or peripheral edema. Pt also reports a 35lb weight loss over 11 months for which she was evaluated for outpatient. She thinks it has something to do with her thyroid, however, she was just started on levothyroxine 1 month ago and is not aware of any further workups she has had. While admitted pt was noted with NSTEMI and underwent cardiac catheterization with plans for medical management and no interventions done. GI consulted for 35lb unintentional weight loss. The patient reports that she has never had an egd or colonoscopy done. She states she has very minimal appetite and when she does eat she fills up very quickly. She has been having mainly loose stools with occasional soft stools but nonbloody and without melena. She has no abdominal pain. She is unaware of any family history of any cancers. Patient reports no abdominal pain, no nausea/vomiting, no melena/hematochezia, no hematemesis,  no bowel habit changes, no dysphagia/odynophagia, no fever/chills. No FHx of colon, gastric, pancreatic or gallbladder disease to her knowledge.       No Known Allergies      aspirin  chewable 81 milliGRAM(s) Oral daily  benzocaine 15 mG/menthol 3.6 mG (Sugar-Free) Lozenge 1 Lozenge Oral every 2 hours PRN  clopidogrel Tablet 75 milliGRAM(s) Oral daily  docusate sodium 100 milliGRAM(s) Oral three times a day  ferrous    sulfate 325 milliGRAM(s) Oral three times a day  influenza   Vaccine 0.5 milliLiter(s) IntraMuscular once  levothyroxine 137 MICROGram(s) Oral daily  metoprolol tartrate 25 milliGRAM(s) Oral two times a day  polyethylene glycol 3350 17 Gram(s) Oral daily  senna 2 Tablet(s) Oral at bedtime  simvastatin 40 milliGRAM(s) Oral at bedtime        FAMILY HISTORY:        Review of Systems:    General:  No fevers, chills, night sweats, fatigue (+) weight loss, 35lbs, decreased appetite   Eyes:  Good vision, no reported pain  ENT:  No sore throat, pain, runny nose, dysphagia  CV:  No pain, palpitations, no lightheadedness  Resp:  No dyspnea, cough, tachypnea, wheezing  GI: denies n/v/d/c, abdominal pain, melena or brbpr   :  No pain, bleeding, incontinence, nocturia  Muscle:  No pain, weakness  Neuro:  No weakness, tingling, memory problems  Psych:  No fatigue, insomnia, mood problems, depression  Endocrine:  No polyuria, polydypsia, cold/heat intolerance  Heme:  No petechiae, ecchymosis, easy bruisability  Skin:  No rash, tattoos, scars, edema    Relevant Family History:   n/c    Relevant Social History: n/c      Physical Exam:    Vital Signs:  Vital Signs Last 24 Hrs  T(C): 36.5 (16 Nov 2018 05:18), Max: 36.5 (15 Nov 2018 21:37)  T(F): 97.7 (16 Nov 2018 05:18), Max: 97.7 (15 Nov 2018 21:37)  HR: 80 (16 Nov 2018 05:18) (77 - 80)  BP: 127/80 (16 Nov 2018 05:18) (124/85 - 127/80)  BP(mean): --  RR: 17 (16 Nov 2018 05:18) (17 - 18)  SpO2: 97% (16 Nov 2018 05:18) (97% - 97%)  Daily     Daily     General:  Appears stated age, well-groomed, nad  HEENT:  NC/AT,  conjunctivae clear and pink, no thyromegaly, nodules, adenopathy, no JVD  Chest:  Full & symmetric excursion, no increased effort, breath sounds clear  Cardiovascular:  Regular rhythm, S1, S2, no murmur/rub/S3/S4, no abdominal bruit, no edema  Abdomen:  Soft, non-tender, non-distended, normoactive bowel sounds,  no masses ,no hepatosplenomeagaly, no signs of chronic liver disease  Extremities:  no cyanosis,clubbing or edema  Skin:  No rash/erythema/ecchymoses/petechiae/wounds/abscess/warm/dry  Neuro/Psych:  A&Ox3  , no asterixis, no tremor, no encephalopathy    Laboratory:                            11.4   6.26  )-----------( 267      ( 16 Nov 2018 05:56 )             36.2     11-16    132<L>  |  100  |  39<H>  ----------------------------<  87  4.2   |  21<L>  |  1.04    Ca    8.7      16 Nov 2018 05:56  Mg     1.9     11-16    TPro  9.3<H>  /  Alb  2.8<L>  /  TBili  0.5  /  DBili  x   /  AST  45<H>  /  ALT  29  /  AlkPhos  124<H>  11-16    LIVER FUNCTIONS - ( 16 Nov 2018 05:56 )  Alb: 2.8 g/dL / Pro: 9.3 g/dL / ALK PHOS: 124 u/L / ALT: 29 u/L / AST: 45 u/L / GGT: x           PT/INR - ( 15 Nov 2018 03:30 )   PT: 13.5 SEC;   INR: 1.18          PTT - ( 15 Nov 2018 10:08 )  PTT:65.2 SEC      Imaging:      < from: CT Angio Chest w/ IV Cont (11.14.18 @ 15:14) >    EXAM:  CT ANGIO CHEST (W)AW IC        PROCEDURE DATE:  Nov 14 2018         INTERPRETATION:  CLINICAL INFORMATION: Chest pain with dyspnea.    COMPARISON: Radiograph the chest 11/14/2018 at 1:35 PM.    PROCEDURE:   CT Angiography of the Chest.  90 ml of Omnipaque 350 was injected intravenously. 10 ml were discarded.  Sagittal and coronal reformats were performed as well as 3D (MIP)   reconstructions.      FINDINGS: The quality of the images are degraded by respiratory motion.    LUNGS AND LARGE AIRWAYS: Patent central airways. Basilar predominant   peripheral reticulations and tractional bronchiectasis representing mild   fibrosis. Interlobular septal thickening representing superimposed   pulmonary edema. Subsegmental atelectasis in the basilar left lower lobe.    6 mm right upper lobe nodule (series 3, image 34).    PLEURA: Small left pleural effusion.    VESSELS: No pulmonary embolism. Main pulmonary artery normal in diameter.    Multivessel coronary atherosclerosis. Coronary stents.     HEART: The heart is enlarged. The right atrium is severely dilated.   Reflux of the contrast within the hepatic veins likely representing   right-sided heart failure. Trace pericardial effusion. Aortic valve   calcifications. No thoracic aortic aneurysm.    MEDIASTINUM AND MARVIN: No lymphadenopathy.    CHEST WALL AND LOWER NECK: Within normal limits.    VISUALIZED UPPER ABDOMEN: Within normal limits.    BONES: Mild degenerative changes.    IMPRESSION:     No pulmonary embolism. No aortic aneurysm or dissection.    Severely dilated right atrium and reflux of the contrast into the hepatic   veins representing right-sided heart failure.    Mild pulmonary edema and basilar predominant interstitial lung disease.    6 mm right upper lobe nodule. Recommend follow-up in 3 months to   determine stability.                  DEBBIE ELLINGTON M.D., RADIOLOGY RESIDENT  This document has been electronically signed.  MATTHEW MCKEON M.D., ATTENDING RADIOLOGIST  This document has been electronically signed. Nov 14 2018  4:10PM                  < end of copied text >

## 2018-11-16 NOTE — CONSULT NOTE ADULT - PROBLEM SELECTOR RECOMMENDATION 9
- r/o malignancy   - CTA Chest reviewed with attg; RHF, Interstitial lung disease and pulmonary nodule  - Interstitial lung dz may be contributing to weight loss  - pulmonary input/medicine input appreciated  - CT Abd/Pelvis ordered for tomorrow to assess for malignancy   - eventual outpatient EGD/Colonoscopy given NSTEMI  - encourage PO intake with ensure supplements TID - r/o malignancy   - CTA Chest reviewed with attg; RHF, Interstitial lung disease and pulmonary nodule  - Interstitial lung dz may be contributing to weight loss  - pulmonary input/medicine input appreciated  - CT Abd/Pelvis ordered for tomorrow to assess for malignancy   - encourage PO intake with ensure supplements TID

## 2018-11-16 NOTE — CONSULT NOTE ADULT - SUBJECTIVE AND OBJECTIVE BOX
Patient is a 73y old  Female who presents with a chief complaint of Chest pain (15 Nov 2018 18:20)      HPI:  74 y/o F w/ PMH of HTN, Hypothyroidism, HLD, and CAD (s/p stent x2 10/5/18) presenting w/ worsening chest pain x3 days. Patient states she had a holter monitor placed by her doctor and she was going to return the monitor today (11/14) when she suddenly started getting chest pain and dizziness. She states the chest pain was 8/10, nonradiating, and it was a sharp pain. She also had some shortness of breath and cough but she was more affected by the dizziness than she was by the dyspnea or cough. She denies any fever, chills, recent illness, sputum production, diaphoresis, N/V/D, hematuria, changes in bowel habits, or peripheral edema. Pt also reports a 35lb weight loss over 11 months for which she was evaluated for outpatient. She thinks it has something to do with her thyroid, however, she was just started on levothyroxine 1 month ago and is not aware of any further workups she has had. (14 Nov 2018 21:08)      ?FOLLOWING PRESENT  [ ] Hx of PE/DVT, [ ] Hx COPD, [ ] Hx of Asthma, [ ] Hx of Hospitalization, [ ]  Hx of BiPAP/CPAP use, [ ] Hx of THALIA    Allergies    No Known Allergies    Intolerances        PAST MEDICAL & SURGICAL HISTORY:  Hypothyroidism, unspecified type  Mixed hyperlipidemia  CAD (coronary artery disease)  HTN (hypertension)      FAMILY HISTORY:      Social History: [  ] TOBACCO                  [  ] ETOH                                 [  ] IVDA/DRUGS    REVIEW OF SYSTEMS      General:	    Skin/Breast:  	  Ophthalmologic:  	  ENMT:	    Respiratory and Thorax:  	  Cardiovascular:	    Gastrointestinal:	    Genitourinary:	    Musculoskeletal:	    Neurological:	    Psychiatric:	    Hematology/Lymphatics:	    Endocrine:	    Allergic/Immunologic:	    MEDICATIONS  (STANDING):  aspirin  chewable 81 milliGRAM(s) Oral daily  clopidogrel Tablet 75 milliGRAM(s) Oral daily  ferrous    sulfate 325 milliGRAM(s) Oral three times a day  influenza   Vaccine 0.5 milliLiter(s) IntraMuscular once  levothyroxine 137 MICROGram(s) Oral daily  metoprolol tartrate 25 milliGRAM(s) Oral two times a day  simvastatin 40 milliGRAM(s) Oral at bedtime    MEDICATIONS  (PRN):  benzocaine 15 mG/menthol 3.6 mG (Sugar-Free) Lozenge 1 Lozenge Oral every 2 hours PRN Sore Throat       Vital Signs Last 24 Hrs  T(C): 36.5 (16 Nov 2018 05:18), Max: 36.5 (15 Nov 2018 21:37)  T(F): 97.7 (16 Nov 2018 05:18), Max: 97.7 (15 Nov 2018 21:37)  HR: 80 (16 Nov 2018 05:18) (77 - 80)  BP: 127/80 (16 Nov 2018 05:18) (124/85 - 127/80)  BP(mean): --  RR: 17 (16 Nov 2018 05:18) (17 - 18)  SpO2: 97% (16 Nov 2018 05:18) (97% - 97%)        I&O's Summary    15 Nov 2018 07:01  -  16 Nov 2018 07:00  --------------------------------------------------------  IN: 400 mL / OUT: 0 mL / NET: 400 mL        Physical Exam:   GENERAL: NAD, well-groomed, well-developed  HEENT: BERTA/   Atraumatic, Normocephalic  ENMT: No tonsillar erythema, exudates, or enlargement; Moist mucous membranes, Good dentition, No lesions  NECK: Supple, No JVD, Normal thyroid  CHEST/LUNG: Clear to auscultation bilaterally; No rales, rhonchi, wheezing, or rubs  CVS: Regular rate and rhythm; No murmurs, rubs, or gallops  GI: : Soft, Nontender, Nondistended; Bowel sounds present  NERVOUS SYSTEM:  Alert & Oriented X3, Good concentration; Motor Strength 5/5 B/L upper and lower extremities; DTRs 2+ intact and symmetric  EXTREMITIES:  2+ Peripheral Pulses, No clubbing, cyanosis, or edema  LYMPH: No lymphadenopathy noted  SKIN: No rashes or lesions  ENDOCRINOLOGY: No Thyromegaly  PSYCH: Appropriate    Labs:  0.0<39<4>>28<<7.415>>0.0<<3><<4><<5<<289>>, -1.4<45<4>>24<<7.345>>-1.4<<3><<4><<5<<249>>  CARDIAC MARKERS ( 15 Nov 2018 03:30 )  x     / x     / 181 u/L / 14.50 ng/mL / x      CARDIAC MARKERS ( 14 Nov 2018 17:00 )  x     / x     / 211 u/L / 18.83 ng/mL / x                                11.4   6.26  )-----------( 267      ( 16 Nov 2018 05:56 )             36.2                         11.6   5.81  )-----------( 264      ( 15 Nov 2018 03:30 )             37.0                         11.0   5.83  )-----------( 247      ( 14 Nov 2018 20:47 )             35.6                         11.0   7.56  )-----------( 253      ( 14 Nov 2018 12:45 )             36.1     11-16    132<L>  |  100  |  39<H>  ----------------------------<  87  4.2   |  21<L>  |  1.04  11-15    133<L>  |  101  |  32<H>  ----------------------------<  93  4.4   |  20<L>  |  0.94  11-14    132<L>  |  101  |  26<H>  ----------------------------<  72  4.3   |  21<L>  |  0.77    Ca    8.7      16 Nov 2018 05:56  Ca    8.7      15 Nov 2018 03:30  Ca    8.8      14 Nov 2018 12:45  Mg     1.9     11-16  Mg     1.8     11-15    TPro  9.3<H>  /  Alb  2.8<L>  /  TBili  0.5  /  DBili  x   /  AST  45<H>  /  ALT  29  /  AlkPhos  124<H>  11-16  TPro  9.3<H>  /  Alb  2.8<L>  /  TBili  0.5  /  DBili  x   /  AST  50<H>  /  ALT  33  /  AlkPhos  149<H>  11-15  TPro  9.5<H>  /  Alb  2.8<L>  /  TBili  0.4  /  DBili  x   /  AST  50<H>  /  ALT  34<H>  /  AlkPhos  151<H>  11-14    CAPILLARY BLOOD GLUCOSE        LIVER FUNCTIONS - ( 16 Nov 2018 05:56 )  Alb: 2.8 g/dL / Pro: 9.3 g/dL / ALK PHOS: 124 u/L / ALT: 29 u/L / AST: 45 u/L / GGT: x           PT/INR - ( 15 Nov 2018 03:30 )   PT: 13.5 SEC;   INR: 1.18          PTT - ( 15 Nov 2018 10:08 )  PTT:65.2 SEC    D DImer  D-Dimer Assay, Quantitative: 3347 ng/mL (11-14 @ 12:45)  Serum Pro-Brain Natriuretic Peptide: 28265 pg/mL (11-14 @ 12:45)      Studies  Chest X-RAY  CT SCAN Chest   CT Abdomen  Venous Dopplers: LE:   Others      < from: CT Angio Chest w/ IV Cont (11.14.18 @ 15:14) >  MEDIASTINUM AND MARVIN: No lymphadenopathy.    CHEST WALL AND LOWER NECK: Within normal limits.    VISUALIZED UPPER ABDOMEN: Within normal limits.    BONES: Mild degenerative changes.    IMPRESSION:     No pulmonary embolism. No aortic aneurysm or dissection.    Severely dilated right atrium and reflux of the contrast into the hepatic   veins representing right-sided heart failure.    Mild pulmonary edema and basilar predominant interstitial lung disease.    6 mm right upper lobe nodule. Recommend follow-up in 3 months to   determine stability.    < end of copied text >    < from: Transthoracic Echocardiogram (11.15.18 @ 13:08) >  ntricle: Normal left ventricular systolic function.  No segmental wall motion abnormalities. Normal left  ventricular internal dimensions and wall thicknesses.  Right Heart: Severe right atrial enlargement. Right  ventricular enlargement with decreased right ventricular  systolic function.  Systolic flattening of the  interventricular septum, consistent with RV pressure  overload. Normal tricuspid valve. Moderate tricuspid  regurgitation. Normal pulmonic valve.  Mild pulmonic  regurgitation.  Pericardium/PleuraNormal pericardium with no pericardial  effusion.  Hemodynamic: Estimated right ventricular systolic pressure  equals 65 mm Hg, assuming right atrial pressure equals 10  mm Hg, consistent with severe pulmonary hypertension.  ------------------------------------------------------------------------  CONCLUSIONS:  1. Mitral annular calcification, otherwise normal mitral  valve. Mild mitral regurgitation.  2. Calcified trileaflet aortic valve with decreased  opening. Peak transaortic valve gradient equals 23 mm Hg,  mean transaortic valve gradient equals 13 mm Hg, estimated  aortic valve area equals 1.1 sqcm (by continuity equation),  consistent with moderate aortic stenosis.  3. Normal left ventricular internal dimensions and wall  thicknesses.  4. Normal left ventricular systolic function. No segmental  wall motion abnormalities.  5. Severe right atrial enlargement.  6. Right ventricular enlargement with decreased right  ventricular systolic function.  Systolic flattening of the  interventricular septum, consistent with RV pressure  overload.  7. Estimated right ventricular systolic pressure equals 65  mm Hg, assuming right atrial pressure equals 10 mm Hg,  consistent with severe pulmonary hypertension.  *** No previous Echo exam.  ------------------------------------------------------------------------  Confirmed on  11/15/2018 - 14:19:43 by Saleem Blanton M.D.  ------------------------------------------------------------------------    < end of copied text >      < from: CT Angio Chest w/ IV Cont (11.14.18 @ 15:14) >  PLEURA: Small left pleural effusion.    VESSELS: No pulmonary embolism. Main pulmonary artery normal in diameter.    Multivessel coronary atherosclerosis. Coronary stents.     HEART: The heart is enlarged. The right atrium is severely dilated.   Reflux of the contrast within the hepatic veins likely representing   right-sided heart failure. Trace pericardial effusion. Aortic valve   calcifications. No thoracic aortic aneurysm.    MEDIASTINUM AND MARVIN: No lymphadenopathy.    CHEST WALL AND LOWER NECK: Within normal limits.    VISUALIZED UPPER ABDOMEN: Within normal limits.    BONES: Mild degenerative changes.    IMPRESSION:     No pulmonary embolism. No aortic aneurysm or dissection.    Severely dilated right atrium and reflux of the contrast into the hepatic   veins representing right-sided heart failure.    Mild pulmonary edema and basilar predominant interstitial lung disease.    6 mm right upper lobe nodule. Recommend follow-up in 3 months to   determine stability.                  DEBBIE ELLINGTON M.D., RADIOLOGY RESIDENT  This document has been electronically signed.  MATTHEW MCKEON M.D., ATTENDING RADIOLOGIST  This document has been electronically signed. Nov 14 2018  4:10PM    < end of copied text > Patient is a 73y old  Female who presents with a chief complaint of Chest pain (15 Nov 2018 18:20)      HPI:  74 y/o F w/ PMH of HTN, Hypothyroidism, HLD, and CAD (s/p stent x2 10/5/18) presenting w/ worsening chest pain x3 days. Patient states she had a holter monitor placed by her doctor and she was going to return the monitor today (11/14) when she suddenly started getting chest pain and dizziness. She states the chest pain was 8/10, nonradiating, and it was a sharp pain. She also had some shortness of breath and cough but she was more affected by the dizziness than she was by the dyspnea or cough. She denies any fever, chills, recent illness, sputum production, diaphoresis, N/V/D, hematuria, changes in bowel habits, or peripheral edema. Pt also reports a 35lb weight loss over 11 months for which she was evaluated for outpatient. She thinks it has something to do with her thyroid, however, she was just started on levothyroxine 1 month ago and is not aware of any further workups she has had. (14 Nov 2018 21:08)    SHE DENIES ANY UNDERLYING LUNG PROBLEM: SHE DOES NOT GET sob ON WALKING AND DENIES HAVING ANY pe OR DVT BEFORE: SHE HAS BEEN LOSING WEIGHT THOUGH      ?FOLLOWING PRESENT  [ X] Hx of PE/DVT, [ X] Hx COPD, X[ ] Hx of Asthma, [ X] Hx of Hospitalization, [X ]  Hx of BiPAP/CPAP use, [ X] Hx of THALIA    Allergies    No Known Allergies    Intolerances        PAST MEDICAL & SURGICAL HISTORY:  Hypothyroidism, unspecified type  Mixed hyperlipidemia  CAD (coronary artery disease)  HTN (hypertension)      FAMILY HISTORY:      Social History: [ X ] TOBACCO                  [  X] ETOH                                 [X  ] IVDA/DRUGS    REVIEW OF SYSTEMS      WEIGHT LOSS    Skin/Breast:x  	  Ophthalmologic:x  	  ENMT:	x    Respiratory and Thorax: no SOB, no Wheezing  	  Cardiovascular:	x    Gastrointestinal:	x    Genitourinary:	x    Musculoskeletal:	x    Neurological:	x    Psychiatric:	x    Hematology/Lymphatics:	x    Endocrine:	x    Allergic/Immunologic:	x    MEDICATIONS  (STANDING):  aspirin  chewable 81 milliGRAM(s) Oral daily  clopidogrel Tablet 75 milliGRAM(s) Oral daily  ferrous    sulfate 325 milliGRAM(s) Oral three times a day  influenza   Vaccine 0.5 milliLiter(s) IntraMuscular once  levothyroxine 137 MICROGram(s) Oral daily  metoprolol tartrate 25 milliGRAM(s) Oral two times a day  simvastatin 40 milliGRAM(s) Oral at bedtime    MEDICATIONS  (PRN):  benzocaine 15 mG/menthol 3.6 mG (Sugar-Free) Lozenge 1 Lozenge Oral every 2 hours PRN Sore Throat       Vital Signs Last 24 Hrs  T(C): 36.5 (16 Nov 2018 05:18), Max: 36.5 (15 Nov 2018 21:37)  T(F): 97.7 (16 Nov 2018 05:18), Max: 97.7 (15 Nov 2018 21:37)  HR: 80 (16 Nov 2018 05:18) (77 - 80)  BP: 127/80 (16 Nov 2018 05:18) (124/85 - 127/80)  BP(mean): --  RR: 17 (16 Nov 2018 05:18) (17 - 18)  SpO2: 97% (16 Nov 2018 05:18) (97% - 97%)        I&O's Summary    15 Nov 2018 07:01  -  16 Nov 2018 07:00  --------------------------------------------------------  IN: 400 mL / OUT: 0 mL / NET: 400 mL        Physical Exam:   GENERAL: thin cachectic  HEENT: BERTA/   Atraumatic, Normocephalic  ENMT: No tonsillar erythema, exudates, or enlargement; Moist mucous membranes, Good dentition, No lesions  NECK: Supple, No JVD, Normal thyroid  CHEST/LUNG: Clear to auscultation bilaterally; No rales, rhonchi, wheezing, or rubs  CVS: Regular rate and rhythm; No murmurs, rubs, or gallops  GI: : Soft, Nontender, Nondistended; Bowel sounds present  NERVOUS SYSTEM:  Alert & Oriented X3,  EXTREMITIES:  2+ Peripheral Pulses, No clubbing, cyanosis, or edema  LYMPH: No lymphadenopathy noted  SKIN: No rashes or lesions  ENDOCRINOLOGY: No Thyromegaly  PSYCH: Appropriate    Labs:  0.0<39<4>>28<<7.415>>0.0<<3><<4><<5<<289>>, -1.4<45<4>>24<<7.345>>-1.4<<3><<4><<5<<249>>  CARDIAC MARKERS ( 15 Nov 2018 03:30 )  x     / x     / 181 u/L / 14.50 ng/mL / x      CARDIAC MARKERS ( 14 Nov 2018 17:00 )  x     / x     / 211 u/L / 18.83 ng/mL / x                                11.4   6.26  )-----------( 267      ( 16 Nov 2018 05:56 )             36.2                         11.6   5.81  )-----------( 264      ( 15 Nov 2018 03:30 )             37.0                         11.0   5.83  )-----------( 247      ( 14 Nov 2018 20:47 )             35.6                         11.0   7.56  )-----------( 253      ( 14 Nov 2018 12:45 )             36.1     11-16    132<L>  |  100  |  39<H>  ----------------------------<  87  4.2   |  21<L>  |  1.04  11-15    133<L>  |  101  |  32<H>  ----------------------------<  93  4.4   |  20<L>  |  0.94  11-14    132<L>  |  101  |  26<H>  ----------------------------<  72  4.3   |  21<L>  |  0.77    Ca    8.7      16 Nov 2018 05:56  Ca    8.7      15 Nov 2018 03:30  Ca    8.8      14 Nov 2018 12:45  Mg     1.9     11-16  Mg     1.8     11-15    TPro  9.3<H>  /  Alb  2.8<L>  /  TBili  0.5  /  DBili  x   /  AST  45<H>  /  ALT  29  /  AlkPhos  124<H>  11-16  TPro  9.3<H>  /  Alb  2.8<L>  /  TBili  0.5  /  DBili  x   /  AST  50<H>  /  ALT  33  /  AlkPhos  149<H>  11-15  TPro  9.5<H>  /  Alb  2.8<L>  /  TBili  0.4  /  DBili  x   /  AST  50<H>  /  ALT  34<H>  /  AlkPhos  151<H>  11-14    CAPILLARY BLOOD GLUCOSE        LIVER FUNCTIONS - ( 16 Nov 2018 05:56 )  Alb: 2.8 g/dL / Pro: 9.3 g/dL / ALK PHOS: 124 u/L / ALT: 29 u/L / AST: 45 u/L / GGT: x           PT/INR - ( 15 Nov 2018 03:30 )   PT: 13.5 SEC;   INR: 1.18          PTT - ( 15 Nov 2018 10:08 )  PTT:65.2 SEC    D DImer  D-Dimer Assay, Quantitative: 3347 ng/mL (11-14 @ 12:45)  Serum Pro-Brain Natriuretic Peptide: 78788 pg/mL (11-14 @ 12:45)      Studies  Chest X-RAY  CT SCAN Chest   CT Abdomen  Venous Dopplers: LE:   Others      < from: CT Angio Chest w/ IV Cont (11.14.18 @ 15:14) >  MEDIASTINUM AND MARVIN: No lymphadenopathy.    CHEST WALL AND LOWER NECK: Within normal limits.    VISUALIZED UPPER ABDOMEN: Within normal limits.    BONES: Mild degenerative changes.    IMPRESSION:     No pulmonary embolism. No aortic aneurysm or dissection.    Severely dilated right atrium and reflux of the contrast into the hepatic   veins representing right-sided heart failure.    Mild pulmonary edema and basilar predominant interstitial lung disease.    6 mm right upper lobe nodule. Recommend follow-up in 3 months to   determine stability.    < end of copied text >    < from: Transthoracic Echocardiogram (11.15.18 @ 13:08) >  ntricle: Normal left ventricular systolic function.  No segmental wall motion abnormalities. Normal left  ventricular internal dimensions and wall thicknesses.  Right Heart: Severe right atrial enlargement. Right  ventricular enlargement with decreased right ventricular  systolic function.  Systolic flattening of the  interventricular septum, consistent with RV pressure  overload. Normal tricuspid valve. Moderate tricuspid  regurgitation. Normal pulmonic valve.  Mild pulmonic  regurgitation.  Pericardium/PleuraNormal pericardium with no pericardial  effusion.  Hemodynamic: Estimated right ventricular systolic pressure  equals 65 mm Hg, assuming right atrial pressure equals 10  mm Hg, consistent with severe pulmonary hypertension.  ------------------------------------------------------------------------  CONCLUSIONS:  1. Mitral annular calcification, otherwise normal mitral  valve. Mild mitral regurgitation.  2. Calcified trileaflet aortic valve with decreased  opening. Peak transaortic valve gradient equals 23 mm Hg,  mean transaortic valve gradient equals 13 mm Hg, estimated  aortic valve area equals 1.1 sqcm (by continuity equation),  consistent with moderate aortic stenosis.  3. Normal left ventricular internal dimensions and wall  thicknesses.  4. Normal left ventricular systolic function. No segmental  wall motion abnormalities.  5. Severe right atrial enlargement.  6. Right ventricular enlargement with decreased right  ventricular systolic function.  Systolic flattening of the  interventricular septum, consistent with RV pressure  overload.  7. Estimated right ventricular systolic pressure equals 65  mm Hg, assuming right atrial pressure equals 10 mm Hg,  consistent with severe pulmonary hypertension.  *** No previous Echo exam.  ------------------------------------------------------------------------  Confirmed on  11/15/2018 - 14:19:43 by Saleem Blanton M.D.  ------------------------------------------------------------------------    < end of copied text >      < from: CT Angio Chest w/ IV Cont (11.14.18 @ 15:14) >  PLEURA: Small left pleural effusion.    VESSELS: No pulmonary embolism. Main pulmonary artery normal in diameter.    Multivessel coronary atherosclerosis. Coronary stents.     HEART: The heart is enlarged. The right atrium is severely dilated.   Reflux of the contrast within the hepatic veins likely representing   right-sided heart failure. Trace pericardial effusion. Aortic valve   calcifications. No thoracic aortic aneurysm.    MEDIASTINUM AND MARVIN: No lymphadenopathy.    CHEST WALL AND LOWER NECK: Within normal limits.    VISUALIZED UPPER ABDOMEN: Within normal limits.    BONES: Mild degenerative changes.    IMPRESSION:     No pulmonary embolism. No aortic aneurysm or dissection.    Severely dilated right atrium and reflux of the contrast into the hepatic   veins representing right-sided heart failure.    Mild pulmonary edema and basilar predominant interstitial lung disease.    6 mm right upper lobe nodule. Recommend follow-up in 3 months to   determine stability.                  DEBBIE ELLINGTON M.D., RADIOLOGY RESIDENT  This document has been electronically signed.  MATTHEW MCKEON M.D., ATTENDING RADIOLOGIST  This document has been electronically signed. Nov 14 2018  4:10PM    < end of copied text >

## 2018-11-17 LAB
ALBUMIN SERPL ELPH-MCNC: 2.7 G/DL — LOW (ref 3.3–5)
ALP SERPL-CCNC: 120 U/L — SIGNIFICANT CHANGE UP (ref 40–120)
ALT FLD-CCNC: 26 U/L — SIGNIFICANT CHANGE UP (ref 4–33)
AST SERPL-CCNC: 40 U/L — HIGH (ref 4–32)
BASOPHILS # BLD AUTO: 0.02 K/UL — SIGNIFICANT CHANGE UP (ref 0–0.2)
BASOPHILS NFR BLD AUTO: 0.3 % — SIGNIFICANT CHANGE UP (ref 0–2)
BILIRUB SERPL-MCNC: 0.7 MG/DL — SIGNIFICANT CHANGE UP (ref 0.2–1.2)
BUN SERPL-MCNC: 37 MG/DL — HIGH (ref 7–23)
CALCIUM SERPL-MCNC: 9 MG/DL — SIGNIFICANT CHANGE UP (ref 8.4–10.5)
CHLORIDE SERPL-SCNC: 101 MMOL/L — SIGNIFICANT CHANGE UP (ref 98–107)
CO2 SERPL-SCNC: 22 MMOL/L — SIGNIFICANT CHANGE UP (ref 22–31)
CREAT SERPL-MCNC: 0.95 MG/DL — SIGNIFICANT CHANGE UP (ref 0.5–1.3)
EOSINOPHIL # BLD AUTO: 0.08 K/UL — SIGNIFICANT CHANGE UP (ref 0–0.5)
EOSINOPHIL NFR BLD AUTO: 1.2 % — SIGNIFICANT CHANGE UP (ref 0–6)
GLUCOSE SERPL-MCNC: 94 MG/DL — SIGNIFICANT CHANGE UP (ref 70–99)
HCT VFR BLD CALC: 37.6 % — SIGNIFICANT CHANGE UP (ref 34.5–45)
HGB BLD-MCNC: 12 G/DL — SIGNIFICANT CHANGE UP (ref 11.5–15.5)
IMM GRANULOCYTES # BLD AUTO: 0.02 # — SIGNIFICANT CHANGE UP
IMM GRANULOCYTES NFR BLD AUTO: 0.3 % — SIGNIFICANT CHANGE UP (ref 0–1.5)
LYMPHOCYTES # BLD AUTO: 1.31 K/UL — SIGNIFICANT CHANGE UP (ref 1–3.3)
LYMPHOCYTES # BLD AUTO: 19.6 % — SIGNIFICANT CHANGE UP (ref 13–44)
MCHC RBC-ENTMCNC: 27.3 PG — SIGNIFICANT CHANGE UP (ref 27–34)
MCHC RBC-ENTMCNC: 31.9 % — LOW (ref 32–36)
MCV RBC AUTO: 85.5 FL — SIGNIFICANT CHANGE UP (ref 80–100)
MONOCYTES # BLD AUTO: 0.79 K/UL — SIGNIFICANT CHANGE UP (ref 0–0.9)
MONOCYTES NFR BLD AUTO: 11.8 % — SIGNIFICANT CHANGE UP (ref 2–14)
NEUTROPHILS # BLD AUTO: 4.47 K/UL — SIGNIFICANT CHANGE UP (ref 1.8–7.4)
NEUTROPHILS NFR BLD AUTO: 66.8 % — SIGNIFICANT CHANGE UP (ref 43–77)
NRBC # FLD: 0 — SIGNIFICANT CHANGE UP
PLATELET # BLD AUTO: 258 K/UL — SIGNIFICANT CHANGE UP (ref 150–400)
PMV BLD: 11.8 FL — SIGNIFICANT CHANGE UP (ref 7–13)
POTASSIUM SERPL-MCNC: 4.2 MMOL/L — SIGNIFICANT CHANGE UP (ref 3.5–5.3)
POTASSIUM SERPL-SCNC: 4.2 MMOL/L — SIGNIFICANT CHANGE UP (ref 3.5–5.3)
PROT SERPL-MCNC: 9.9 G/DL — HIGH (ref 6–8.3)
RBC # BLD: 4.4 M/UL — SIGNIFICANT CHANGE UP (ref 3.8–5.2)
RBC # FLD: 16.1 % — HIGH (ref 10.3–14.5)
SODIUM SERPL-SCNC: 132 MMOL/L — LOW (ref 135–145)
WBC # BLD: 6.69 K/UL — SIGNIFICANT CHANGE UP (ref 3.8–10.5)
WBC # FLD AUTO: 6.69 K/UL — SIGNIFICANT CHANGE UP (ref 3.8–10.5)

## 2018-11-17 PROCEDURE — 74177 CT ABD & PELVIS W/CONTRAST: CPT | Mod: 26

## 2018-11-17 RX ADMIN — Medication 25 MILLIGRAM(S): at 17:04

## 2018-11-17 RX ADMIN — Medication 325 MILLIGRAM(S): at 05:19

## 2018-11-17 RX ADMIN — Medication 325 MILLIGRAM(S): at 13:51

## 2018-11-17 RX ADMIN — SENNA PLUS 2 TABLET(S): 8.6 TABLET ORAL at 20:05

## 2018-11-17 RX ADMIN — Medication 20 MILLIGRAM(S): at 05:19

## 2018-11-17 RX ADMIN — Medication 100 MILLIGRAM(S): at 05:19

## 2018-11-17 RX ADMIN — SIMVASTATIN 40 MILLIGRAM(S): 20 TABLET, FILM COATED ORAL at 20:05

## 2018-11-17 RX ADMIN — Medication 137 MICROGRAM(S): at 05:19

## 2018-11-17 RX ADMIN — Medication 100 MILLIGRAM(S): at 20:05

## 2018-11-17 RX ADMIN — Medication 81 MILLIGRAM(S): at 13:51

## 2018-11-17 RX ADMIN — CLOPIDOGREL BISULFATE 75 MILLIGRAM(S): 75 TABLET, FILM COATED ORAL at 13:51

## 2018-11-17 RX ADMIN — Medication 25 MILLIGRAM(S): at 05:19

## 2018-11-17 RX ADMIN — Medication 325 MILLIGRAM(S): at 20:05

## 2018-11-17 NOTE — PROGRESS NOTE ADULT - ATTENDING COMMENTS
CARDIOLOGY ATTENDING    Patient seen and examined. Agree with above. Admitted with newly diagnosed NICM. Would start toprol xl 25 and lisinopril 5, and then repeat echo in 3 months to determine candidacy for primary prevention ICD. No further inpatient cardiac workup needed. D/C planning in progress. CARDIOLOGY ATTENDING    Agree with above. f/u pulmonary regarding workup if ILD?

## 2018-11-18 LAB
ALBUMIN SERPL ELPH-MCNC: 2.7 G/DL — LOW (ref 3.3–5)
ALP SERPL-CCNC: 116 U/L — SIGNIFICANT CHANGE UP (ref 40–120)
ALT FLD-CCNC: 30 U/L — SIGNIFICANT CHANGE UP (ref 4–33)
AST SERPL-CCNC: 43 U/L — HIGH (ref 4–32)
BASOPHILS # BLD AUTO: 0.02 K/UL — SIGNIFICANT CHANGE UP (ref 0–0.2)
BASOPHILS NFR BLD AUTO: 0.3 % — SIGNIFICANT CHANGE UP (ref 0–2)
BILIRUB SERPL-MCNC: 0.6 MG/DL — SIGNIFICANT CHANGE UP (ref 0.2–1.2)
BUN SERPL-MCNC: 34 MG/DL — HIGH (ref 7–23)
CALCIUM SERPL-MCNC: 9 MG/DL — SIGNIFICANT CHANGE UP (ref 8.4–10.5)
CHLORIDE SERPL-SCNC: 99 MMOL/L — SIGNIFICANT CHANGE UP (ref 98–107)
CO2 SERPL-SCNC: 23 MMOL/L — SIGNIFICANT CHANGE UP (ref 22–31)
CREAT SERPL-MCNC: 0.96 MG/DL — SIGNIFICANT CHANGE UP (ref 0.5–1.3)
EOSINOPHIL # BLD AUTO: 0.07 K/UL — SIGNIFICANT CHANGE UP (ref 0–0.5)
EOSINOPHIL NFR BLD AUTO: 0.9 % — SIGNIFICANT CHANGE UP (ref 0–6)
GLUCOSE SERPL-MCNC: 97 MG/DL — SIGNIFICANT CHANGE UP (ref 70–99)
HCT VFR BLD CALC: 39.6 % — SIGNIFICANT CHANGE UP (ref 34.5–45)
HGB BLD-MCNC: 12.3 G/DL — SIGNIFICANT CHANGE UP (ref 11.5–15.5)
IMM GRANULOCYTES # BLD AUTO: 0.03 # — SIGNIFICANT CHANGE UP
IMM GRANULOCYTES NFR BLD AUTO: 0.4 % — SIGNIFICANT CHANGE UP (ref 0–1.5)
LYMPHOCYTES # BLD AUTO: 2.32 K/UL — SIGNIFICANT CHANGE UP (ref 1–3.3)
LYMPHOCYTES # BLD AUTO: 29 % — SIGNIFICANT CHANGE UP (ref 13–44)
MAGNESIUM SERPL-MCNC: 2 MG/DL — SIGNIFICANT CHANGE UP (ref 1.6–2.6)
MCHC RBC-ENTMCNC: 26.6 PG — LOW (ref 27–34)
MCHC RBC-ENTMCNC: 31.1 % — LOW (ref 32–36)
MCV RBC AUTO: 85.7 FL — SIGNIFICANT CHANGE UP (ref 80–100)
MONOCYTES # BLD AUTO: 0.79 K/UL — SIGNIFICANT CHANGE UP (ref 0–0.9)
MONOCYTES NFR BLD AUTO: 9.9 % — SIGNIFICANT CHANGE UP (ref 2–14)
NEUTROPHILS # BLD AUTO: 4.77 K/UL — SIGNIFICANT CHANGE UP (ref 1.8–7.4)
NEUTROPHILS NFR BLD AUTO: 59.5 % — SIGNIFICANT CHANGE UP (ref 43–77)
NRBC # FLD: 0 — SIGNIFICANT CHANGE UP
PLATELET # BLD AUTO: 271 K/UL — SIGNIFICANT CHANGE UP (ref 150–400)
PMV BLD: 11.9 FL — SIGNIFICANT CHANGE UP (ref 7–13)
POTASSIUM SERPL-MCNC: 4 MMOL/L — SIGNIFICANT CHANGE UP (ref 3.5–5.3)
POTASSIUM SERPL-SCNC: 4 MMOL/L — SIGNIFICANT CHANGE UP (ref 3.5–5.3)
PROT SERPL-MCNC: 10.1 G/DL — HIGH (ref 6–8.3)
RBC # BLD: 4.62 M/UL — SIGNIFICANT CHANGE UP (ref 3.8–5.2)
RBC # FLD: 16.2 % — HIGH (ref 10.3–14.5)
SODIUM SERPL-SCNC: 132 MMOL/L — LOW (ref 135–145)
WBC # BLD: 8 K/UL — SIGNIFICANT CHANGE UP (ref 3.8–10.5)
WBC # FLD AUTO: 8 K/UL — SIGNIFICANT CHANGE UP (ref 3.8–10.5)

## 2018-11-18 RX ORDER — BUDESONIDE AND FORMOTEROL FUMARATE DIHYDRATE 160; 4.5 UG/1; UG/1
2 AEROSOL RESPIRATORY (INHALATION)
Qty: 0 | Refills: 0 | Status: DISCONTINUED | OUTPATIENT
Start: 2018-11-18 | End: 2018-11-19

## 2018-11-18 RX ADMIN — Medication 325 MILLIGRAM(S): at 13:11

## 2018-11-18 RX ADMIN — SENNA PLUS 2 TABLET(S): 8.6 TABLET ORAL at 19:37

## 2018-11-18 RX ADMIN — Medication 137 MICROGRAM(S): at 06:05

## 2018-11-18 RX ADMIN — Medication 20 MILLIGRAM(S): at 06:05

## 2018-11-18 RX ADMIN — Medication 100 MILLIGRAM(S): at 19:37

## 2018-11-18 RX ADMIN — Medication 325 MILLIGRAM(S): at 06:05

## 2018-11-18 RX ADMIN — Medication 100 MILLIGRAM(S): at 06:05

## 2018-11-18 RX ADMIN — Medication 81 MILLIGRAM(S): at 13:11

## 2018-11-18 RX ADMIN — Medication 25 MILLIGRAM(S): at 17:42

## 2018-11-18 RX ADMIN — Medication 25 MILLIGRAM(S): at 06:05

## 2018-11-18 RX ADMIN — Medication 100 MILLIGRAM(S): at 13:11

## 2018-11-18 RX ADMIN — SIMVASTATIN 40 MILLIGRAM(S): 20 TABLET, FILM COATED ORAL at 19:37

## 2018-11-18 RX ADMIN — CLOPIDOGREL BISULFATE 75 MILLIGRAM(S): 75 TABLET, FILM COATED ORAL at 13:11

## 2018-11-18 RX ADMIN — Medication 325 MILLIGRAM(S): at 19:37

## 2018-11-19 ENCOUNTER — TRANSCRIPTION ENCOUNTER (OUTPATIENT)
Age: 73
End: 2018-11-19

## 2018-11-19 VITALS
RESPIRATION RATE: 18 BRPM | TEMPERATURE: 98 F | SYSTOLIC BLOOD PRESSURE: 114 MMHG | OXYGEN SATURATION: 100 % | HEART RATE: 90 BPM | DIASTOLIC BLOOD PRESSURE: 84 MMHG

## 2018-11-19 LAB
ALBUMIN SERPL ELPH-MCNC: 2.5 G/DL — LOW (ref 3.3–5)
ALP SERPL-CCNC: 106 U/L — SIGNIFICANT CHANGE UP (ref 40–120)
ALT FLD-CCNC: 26 U/L — SIGNIFICANT CHANGE UP (ref 4–33)
AST SERPL-CCNC: 41 U/L — HIGH (ref 4–32)
BASOPHILS # BLD AUTO: 0.04 K/UL — SIGNIFICANT CHANGE UP (ref 0–0.2)
BASOPHILS NFR BLD AUTO: 0.6 % — SIGNIFICANT CHANGE UP (ref 0–2)
BILIRUB SERPL-MCNC: 0.6 MG/DL — SIGNIFICANT CHANGE UP (ref 0.2–1.2)
BUN SERPL-MCNC: 31 MG/DL — HIGH (ref 7–23)
CALCIUM SERPL-MCNC: 8.6 MG/DL — SIGNIFICANT CHANGE UP (ref 8.4–10.5)
CHLORIDE SERPL-SCNC: 98 MMOL/L — SIGNIFICANT CHANGE UP (ref 98–107)
CO2 SERPL-SCNC: 23 MMOL/L — SIGNIFICANT CHANGE UP (ref 22–31)
CREAT SERPL-MCNC: 0.89 MG/DL — SIGNIFICANT CHANGE UP (ref 0.5–1.3)
ENA SCL70 AB SER-ACNC: 0.2 — SIGNIFICANT CHANGE UP
EOSINOPHIL # BLD AUTO: 0.08 K/UL — SIGNIFICANT CHANGE UP (ref 0–0.5)
EOSINOPHIL NFR BLD AUTO: 1.3 % — SIGNIFICANT CHANGE UP (ref 0–6)
GLUCOSE SERPL-MCNC: 88 MG/DL — SIGNIFICANT CHANGE UP (ref 70–99)
HCT VFR BLD CALC: 36.7 % — SIGNIFICANT CHANGE UP (ref 34.5–45)
HGB BLD-MCNC: 11.7 G/DL — SIGNIFICANT CHANGE UP (ref 11.5–15.5)
IMM GRANULOCYTES # BLD AUTO: 0.04 # — SIGNIFICANT CHANGE UP
IMM GRANULOCYTES NFR BLD AUTO: 0.6 % — SIGNIFICANT CHANGE UP (ref 0–1.5)
LYMPHOCYTES # BLD AUTO: 1.21 K/UL — SIGNIFICANT CHANGE UP (ref 1–3.3)
LYMPHOCYTES # BLD AUTO: 19 % — SIGNIFICANT CHANGE UP (ref 13–44)
MAGNESIUM SERPL-MCNC: 1.9 MG/DL — SIGNIFICANT CHANGE UP (ref 1.6–2.6)
MCHC RBC-ENTMCNC: 27.5 PG — SIGNIFICANT CHANGE UP (ref 27–34)
MCHC RBC-ENTMCNC: 31.9 % — LOW (ref 32–36)
MCV RBC AUTO: 86.2 FL — SIGNIFICANT CHANGE UP (ref 80–100)
MONOCYTES # BLD AUTO: 0.63 K/UL — SIGNIFICANT CHANGE UP (ref 0–0.9)
MONOCYTES NFR BLD AUTO: 9.9 % — SIGNIFICANT CHANGE UP (ref 2–14)
NEUTROPHILS # BLD AUTO: 4.36 K/UL — SIGNIFICANT CHANGE UP (ref 1.8–7.4)
NEUTROPHILS NFR BLD AUTO: 68.6 % — SIGNIFICANT CHANGE UP (ref 43–77)
NRBC # FLD: 0 — SIGNIFICANT CHANGE UP
PLATELET # BLD AUTO: 245 K/UL — SIGNIFICANT CHANGE UP (ref 150–400)
PMV BLD: 11.6 FL — SIGNIFICANT CHANGE UP (ref 7–13)
POTASSIUM SERPL-MCNC: 4.1 MMOL/L — SIGNIFICANT CHANGE UP (ref 3.5–5.3)
POTASSIUM SERPL-SCNC: 4.1 MMOL/L — SIGNIFICANT CHANGE UP (ref 3.5–5.3)
PROT SERPL-MCNC: 9.2 G/DL — HIGH (ref 6–8.3)
RBC # BLD: 4.26 M/UL — SIGNIFICANT CHANGE UP (ref 3.8–5.2)
RBC # FLD: 16.3 % — HIGH (ref 10.3–14.5)
SODIUM SERPL-SCNC: 131 MMOL/L — LOW (ref 135–145)
WBC # BLD: 6.36 K/UL — SIGNIFICANT CHANGE UP (ref 3.8–10.5)
WBC # FLD AUTO: 6.36 K/UL — SIGNIFICANT CHANGE UP (ref 3.8–10.5)

## 2018-11-19 RX ORDER — LEVOTHYROXINE SODIUM 125 MCG
1 TABLET ORAL
Qty: 0 | Refills: 0 | COMMUNITY
Start: 2018-11-19

## 2018-11-19 RX ORDER — CLOPIDOGREL BISULFATE 75 MG/1
1 TABLET, FILM COATED ORAL
Qty: 0 | Refills: 0 | COMMUNITY

## 2018-11-19 RX ORDER — METOPROLOL TARTRATE 50 MG
1 TABLET ORAL
Qty: 60 | Refills: 0 | OUTPATIENT
Start: 2018-11-19 | End: 2018-12-18

## 2018-11-19 RX ORDER — METOPROLOL TARTRATE 50 MG
1 TABLET ORAL
Qty: 0 | Refills: 0 | COMMUNITY
Start: 2018-11-19

## 2018-11-19 RX ORDER — SIMVASTATIN 20 MG/1
1 TABLET, FILM COATED ORAL
Qty: 0 | Refills: 0 | COMMUNITY
Start: 2018-11-19

## 2018-11-19 RX ORDER — ASPIRIN/CALCIUM CARB/MAGNESIUM 324 MG
1 TABLET ORAL
Qty: 0 | Refills: 0 | COMMUNITY
Start: 2018-11-19

## 2018-11-19 RX ORDER — ASPIRIN/CALCIUM CARB/MAGNESIUM 324 MG
1 TABLET ORAL
Qty: 0 | Refills: 0 | COMMUNITY

## 2018-11-19 RX ORDER — SENNA PLUS 8.6 MG/1
2 TABLET ORAL
Qty: 0 | Refills: 0 | COMMUNITY
Start: 2018-11-19

## 2018-11-19 RX ORDER — MAGNESIUM OXIDE 400 MG ORAL TABLET 241.3 MG
400 TABLET ORAL
Qty: 0 | Refills: 0 | Status: DISCONTINUED | OUTPATIENT
Start: 2018-11-19 | End: 2018-11-19

## 2018-11-19 RX ORDER — CLOPIDOGREL BISULFATE 75 MG/1
1 TABLET, FILM COATED ORAL
Qty: 0 | Refills: 0 | COMMUNITY
Start: 2018-11-19

## 2018-11-19 RX ORDER — FUROSEMIDE 40 MG
20 TABLET ORAL DAILY
Qty: 0 | Refills: 0 | Status: DISCONTINUED | OUTPATIENT
Start: 2018-11-19 | End: 2018-11-19

## 2018-11-19 RX ORDER — METOPROLOL TARTRATE 50 MG
1 TABLET ORAL
Qty: 0 | Refills: 0 | COMMUNITY

## 2018-11-19 RX ORDER — BUDESONIDE AND FORMOTEROL FUMARATE DIHYDRATE 160; 4.5 UG/1; UG/1
2 AEROSOL RESPIRATORY (INHALATION)
Qty: 1 | Refills: 0 | OUTPATIENT
Start: 2018-11-19 | End: 2018-12-18

## 2018-11-19 RX ORDER — LEVOTHYROXINE SODIUM 125 MCG
1 TABLET ORAL
Qty: 0 | Refills: 0 | COMMUNITY

## 2018-11-19 RX ORDER — SIMVASTATIN 20 MG/1
1 TABLET, FILM COATED ORAL
Qty: 0 | Refills: 0 | COMMUNITY

## 2018-11-19 RX ORDER — FUROSEMIDE 40 MG
1 TABLET ORAL
Qty: 30 | Refills: 0 | OUTPATIENT
Start: 2018-11-19 | End: 2018-12-18

## 2018-11-19 RX ORDER — POLYETHYLENE GLYCOL 3350 17 G/17G
17 POWDER, FOR SOLUTION ORAL
Qty: 0 | Refills: 0 | COMMUNITY
Start: 2018-11-19

## 2018-11-19 RX ORDER — DOCUSATE SODIUM 100 MG
1 CAPSULE ORAL
Qty: 0 | Refills: 0 | COMMUNITY
Start: 2018-11-19

## 2018-11-19 RX ADMIN — Medication 20 MILLIGRAM(S): at 05:09

## 2018-11-19 RX ADMIN — Medication 137 MICROGRAM(S): at 05:09

## 2018-11-19 RX ADMIN — Medication 325 MILLIGRAM(S): at 13:17

## 2018-11-19 RX ADMIN — CLOPIDOGREL BISULFATE 75 MILLIGRAM(S): 75 TABLET, FILM COATED ORAL at 13:17

## 2018-11-19 RX ADMIN — BUDESONIDE AND FORMOTEROL FUMARATE DIHYDRATE 2 PUFF(S): 160; 4.5 AEROSOL RESPIRATORY (INHALATION) at 09:45

## 2018-11-19 RX ADMIN — Medication 25 MILLIGRAM(S): at 05:09

## 2018-11-19 RX ADMIN — MAGNESIUM OXIDE 400 MG ORAL TABLET 400 MILLIGRAM(S): 241.3 TABLET ORAL at 13:18

## 2018-11-19 RX ADMIN — Medication 81 MILLIGRAM(S): at 13:17

## 2018-11-19 RX ADMIN — Medication 325 MILLIGRAM(S): at 05:09

## 2018-11-19 RX ADMIN — Medication 100 MILLIGRAM(S): at 05:09

## 2018-11-19 NOTE — DISCHARGE NOTE ADULT - MEDICATION SUMMARY - MEDICATIONS TO TAKE
I will START or STAY ON the medications listed below when I get home from the hospital:    aspirin 81 mg oral tablet, chewable  -- 1 tab(s) by mouth once a day  -- Indication: For CAD (coronary artery disease)    simvastatin 40 mg oral tablet  -- 1 tab(s) by mouth once a day (at bedtime)  -- Indication: For Mixed hyperlipidemia    clopidogrel 75 mg oral tablet  -- 1 tab(s) by mouth once a day  -- Indication: For CAD (coronary artery disease)    metoprolol tartrate 25 mg oral tablet  -- 1 tab(s) by mouth 2 times a day  -- Indication: For CAD (coronary artery disease)    budesonide-formoterol 160 mcg-4.5 mcg/inh inhalation aerosol  -- 2 puff(s) inhaled 2 times a day   -- Indication: For Interstitial lung disease    furosemide 20 mg oral tablet  -- 1 tab(s) by mouth once a day  -- Indication: For Acute right-sided heart failure    ferrous gluconate 324 mg (38 mg elemental iron) oral tablet  -- 1  by mouth 3 times a day  -- Indication: For Anemia    docusate sodium 100 mg oral capsule  -- 1 cap(s) by mouth 3 times a day  -- Indication: For Constipation    polyethylene glycol 3350 oral powder for reconstitution  -- 17 gram(s) by mouth once a day  -- Indication: For Constipation    senna oral tablet  -- 2 tab(s) by mouth once a day (at bedtime)  -- Indication: For Constipation    levothyroxine 137 mcg (0.137 mg) oral tablet  -- 1 tab(s) by mouth once a day  -- Indication: For Hypothyroidism, unspecified type

## 2018-11-19 NOTE — DISCHARGE NOTE ADULT - CARE PLAN
Principal Discharge DX:	NSTEMI (non-ST elevated myocardial infarction)  Goal:	To be asymptomatic, to reduce risks factors such as hypertension, diabetes and hyperlipidemia to lower the risk of blood clots formation; and to prevent complications of coronary artery disease such as worsening chest pain, heart attack and death.  Assessment and plan of treatment:	Continue aspirin and Plavix, do not stop unless instructed by your physician.  Continue low salt, fat, cholesterol and carbohydrate diet. Follow up with cardiologist and primary care physician's routine appointment.  Secondary Diagnosis:	Acute right-sided heart failure  Goal:	To relieve and prevent worsening symptoms associated with congestive heart failure, to improve quality of life, and to treat underlying conditions such as coronary heart disease, high blood pressure, or diabetes, and to maintain euvolemia.  Assessment and plan of treatment:	Low salt diet, fluid restriction to 1500 ml daily, monitor your fluid intake and weight daily, exercise as tolerated 30 minutes daily, and follow up with your physician within 1 to 2 weeks.  Secondary Diagnosis:	Mixed hyperlipidemia  Goal:	To maintain normal cholesterol levels to prevent stroke, coronary artery disease, peripheral vascular disease and heart attacks.  Assessment and plan of treatment:	Low fat diet, exercise daily and continue current medications. Follow up with primary care physician and cardiologist for management.  Secondary Diagnosis:	Hypothyroidism, unspecified type  Secondary Diagnosis:	Essential hypertension  Goal:	To maintain a normal blood pressure to prevent heart attack, stroke and renal failure.  Assessment and plan of treatment:	Low sodium and fat diet, continue anti-hypertensive medications, and follow up with primary care physician.  Secondary Diagnosis:	CAD (coronary artery disease)  Goal:	To be asymptomatic, to reduce risks factors such as hypertension, diabetes and hyperlipidemia to lower the risk of blood clots formation; and to prevent complications of coronary artery disease such as worsening chest pain, heart attack and death.  Assessment and plan of treatment:	Continue aspirin and Plavix, do not stop unless instructed by your physician.  Continue low salt, fat, cholesterol and carbohydrate diet. Follow up with cardiologist and primary care physician's routine appointment.

## 2018-11-19 NOTE — PROGRESS NOTE ADULT - PROBLEM SELECTOR PROBLEM 3
Pulmonary nodule
Essential hypertension
Acute right-sided heart failure
Essential hypertension
Essential hypertension
NSTEMI (non-ST elevated myocardial infarction)
Essential hypertension
NSTEMI (non-ST elevated myocardial infarction)

## 2018-11-19 NOTE — DISCHARGE NOTE ADULT - CARE PROVIDER_API CALL
Carolina Ortega), Cardiovascular Disease; Internal Medicine; Interventional Cardiology  [address] [facility] [address]  [place] [address]  Phone: [phone]  Fax: [phone]    Kurtis Manzanares), Critical Care Medicine; Internal Medicine; Pulmonary Disease  [address] [place] [address]  Phone: [phone]  Fax: [phone]    Ramón Fuentes (), Gastroenterology; Internal Medicine  [address] [place] [address]  Phone: [phone]  Fax: [phone]

## 2018-11-19 NOTE — PROGRESS NOTE ADULT - PROBLEM SELECTOR PROBLEM 5
Essential hypertension
Hypothyroidism, unspecified type
Weight loss, unintentional
Hypothyroidism, unspecified type
Weight loss, unintentional

## 2018-11-19 NOTE — PROGRESS NOTE ADULT - ASSESSMENT
74 y/o F w/ PMH of HTN, Hypothyroidism, HLD, and CAD (s/p stent x2 10/5/18) presenting w/ worsening chest pain x3 days. Pt also reports a 35lb weight loss over 11 months.
72 y/o F w/ PMH of HTN, Hypothyroidism, HLD, and CAD (s/p stent x2 10/5/18) presenting w/ worsening chest pain x3 days. Pt also reports a 35lb weight loss over 11 months.
72 y/o F w/ PMH of HTN, Hypothyroidism, HLD, and CAD (s/p stent x2 10/5/18) presenting w/ worsening chest pain x3 days. Pt also reports a 35lb weight loss over 11 months.
72 y/o F w/ PMH of HTN, Hypothyroidism, HLD, and CAD (s/p stent x2 10/5/18) presenting w/ worsening chest pain x3 days. Pt also reports a 35lb weight loss over 11 months.    +NSTEMI on heparin gtt  +CHF on IV lasix 20  +35lb Wt loss
72 yo F w/ PMH of HTN, Hypothyroidism, HLD, and CAD (s/p stent x2 10/5/18 on Plavix) presenting w/ worsening chest pain x3 days noted to have NSTEMI with right sided heart failure during admission and with interstitial lung dz on CTA Chest
72 y/o F w/ PMH of HTN, Hypothyroidism, HLD, and CAD (s/p stent x2 10/5/18) presenting w/ worsening chest pain x3 days. Pt also reports a 35lb weight loss over 11 months.
72 y/o F w/ PMH of HTN, Hypothyroidism, HLD, and CAD (s/p stent x2 10/5/18) presenting w/ worsening chest pain x3 days. Pt also reports a 35lb weight loss over 11 months.    +NSTEMI on heparin gtt  +CHF on IV lasix 20  +35lb Wt loss
72 y/o F w/ PMH of HTN, Hypothyroidism, HLD, and CAD (s/p stent x2 10/5/18) presenting w/ worsening chest pain x3 days. Pt also reports a 35lb weight loss over 11 months.    +NSTEMI on heparin gtt  +CHF on IV lasix 20  +35lb Wt loss

## 2018-11-19 NOTE — DISCHARGE NOTE ADULT - PATIENT PORTAL LINK FT
You can access the [facility] Patient Portal, offered by [facility], by registering with the following website: [email or web address]

## 2018-11-19 NOTE — PROGRESS NOTE ADULT - SUBJECTIVE AND OBJECTIVE BOX
Patient is a 73y old  Female who presents with a chief complaint of Chest pain (18 Nov 2018 12:49)    SUBJECTIVE / OVERNIGHT EVENTS: no overnight events    ROS:  Resp: No cough no sputum production  CVS: No chest pain no palpitations no orthopnea  GI: no N/V/D  : no dysuria, no hematuria  Neuro: no weakness no paresthesias  Heme: No petechiae no easy bruising  Msk: No joint pain no swelling  Skin: No rash no itching        MEDICATIONS  (STANDING):  aspirin  chewable 81 milliGRAM(s) Oral daily  buDESOnide 160 MICROgram(s)/formoterol 4.5 MICROgram(s) Inhaler 2 Puff(s) Inhalation two times a day  clopidogrel Tablet 75 milliGRAM(s) Oral daily  docusate sodium 100 milliGRAM(s) Oral three times a day  ferrous    sulfate 325 milliGRAM(s) Oral three times a day  furosemide   Injectable 20 milliGRAM(s) IV Push daily  influenza   Vaccine 0.5 milliLiter(s) IntraMuscular once  levothyroxine 137 MICROGram(s) Oral daily  metoprolol tartrate 25 milliGRAM(s) Oral two times a day  polyethylene glycol 3350 17 Gram(s) Oral daily  senna 2 Tablet(s) Oral at bedtime  simvastatin 40 milliGRAM(s) Oral at bedtime    MEDICATIONS  (PRN):  benzocaine 15 mG/menthol 3.6 mG (Sugar-Free) Lozenge 1 Lozenge Oral every 2 hours PRN Sore Throat        CAPILLARY BLOOD GLUCOSE        I&O's Summary    17 Nov 2018 07:01  -  18 Nov 2018 07:00  --------------------------------------------------------  IN: 360 mL / OUT: 0 mL / NET: 360 mL        Vital Signs Last 24 Hrs  T(C): 36.3 (18 Nov 2018 12:11), Max: 36.5 (17 Nov 2018 13:47)  T(F): 97.3 (18 Nov 2018 12:11), Max: 97.7 (17 Nov 2018 13:47)  HR: 86 (18 Nov 2018 12:11) (83 - 98)  BP: 122/74 (18 Nov 2018 12:11) (112/85 - 127/90)  BP(mean): --  RR: 18 (18 Nov 2018 12:11) (18 - 18)  SpO2: 98% (18 Nov 2018 12:11) (90% - 100%)    PHYSICAL EXAM:  GENERAL: NAD, well-developed  HEAD:  Atraumatic, Normocephalic  EYES: EOMI, PERRLA, conjunctiva and sclera clear  NECK: Supple, No JVD  CHEST/LUNG: Clear to auscultation bilaterally; No wheeze  HEART: S1S2; soft ejection systolic murmur best heard at left sternal border No rubs, or gallops  ABDOMEN: Soft, Nontender, Nondistended; Bowel sounds present  EXTREMITIES:  + Peripheral Pulses, No clubbing or cyanosis, no edema  PSYCH: AO x 3,   NEUROLOGY: Alert, no focal motor or sensory deficits  SKIN: No rashes or lesions    LABS:                        12.3   8.00  )-----------( 271      ( 18 Nov 2018 06:40 )             39.6     11-18    132<L>  |  99  |  34<H>  ----------------------------<  97  4.0   |  23  |  0.96    Ca    9.0      18 Nov 2018 06:40  Mg     2.0     11-18    TPro  10.1<H>  /  Alb  2.7<L>  /  TBili  0.6  /  DBili  x   /  AST  43<H>  /  ALT  30  /  AlkPhos  116  11-18                All consultant(s) notes reviewed and care discussed with other providers    Contact Number, Dr Catalan [phone]
SUBJECTIVE:  Patient with no anginal chest pain or shortness of breath  ROS otherwise negative       MEDICATIONS  (STANDING):  aspirin  chewable 81 milliGRAM(s) Oral daily  buDESOnide 160 MICROgram(s)/formoterol 4.5 MICROgram(s) Inhaler 2 Puff(s) Inhalation two times a day  clopidogrel Tablet 75 milliGRAM(s) Oral daily  docusate sodium 100 milliGRAM(s) Oral three times a day  ferrous    sulfate 325 milliGRAM(s) Oral three times a day  furosemide   Injectable 20 milliGRAM(s) IV Push daily  influenza   Vaccine 0.5 milliLiter(s) IntraMuscular once  levothyroxine 137 MICROGram(s) Oral daily  metoprolol tartrate 25 milliGRAM(s) Oral two times a day  polyethylene glycol 3350 17 Gram(s) Oral daily  senna 2 Tablet(s) Oral at bedtime  simvastatin 40 milliGRAM(s) Oral at bedtime      LABS:                        12.3   8.00  )-----------( 271      ( 18 Nov 2018 06:40 )             39.6     132<L>  |  99  |  34<H>  ----------------------------<  97  4.0   |  23  |  0.96    Ca    9.0      18 Nov 2018 06:40  Mg     2.0     11-18    TPro  10.1<H>  /  Alb  2.7<L>  /  TBili  0.6  /  DBili  x   /  AST  43<H>  /  ALT  30  /  AlkPhos  116  11-18    Creatinine Trend: 0.96<--, 0.95<--, 1.04<--, 0.94<--, 0.77<--     PHYSICAL EXAM  Vital Signs Last 24 Hrs  T(C): 36.3 (18 Nov 2018 12:11), Max: 36.4 (18 Nov 2018 06:03)  T(F): 97.3 (18 Nov 2018 12:11), Max: 97.6 (18 Nov 2018 06:03)  HR: 86 (18 Nov 2018 12:11) (83 - 98)  BP: 122/74 (18 Nov 2018 12:11) (112/85 - 127/90)  RR: 18 (18 Nov 2018 12:11) (18 - 18)  SpO2: 98% (18 Nov 2018 12:11) (98% - 100%)      HEENT: Normal Oral mucosa, PERRL, EOMI  Lymphatic: No obvious lymphadenopathy, No edema  Cardiovascular: Normal S1S2, No JVD, 1/6 YIFAN, Peripheral pulses palpable 2+ B/L  Respiratory: Lungs clear to auscultation, normal effort  Gastrointestinal: Abdomen soft, ND, NT, +BS  Skin: Warm, dry, intact. No cyanosis, No rash.  Musculoskeletal: Normal ROM, normal strength  Psychiatric: Appropriate Mood and Affect      DIAGNOSTIC DATA    TELEMETRY: NSR no events    CT Angio Chest w/ IV Cont (11.14.18 @ 15:14) >  IMPRESSION:     No pulmonary embolism. No aortic aneurysm or dissection.    Severely dilated right atrium and reflux of the contrast into the hepatic   veins representing right-sided heart failure.    Mild pulmonary edema and basilar predominant interstitial lung disease.    6 mm right upper lobe nodule. Recommend follow-up in 3 months to   determine stability.      < from: Cardiac Cath Lab - Adult (11.15.18 @ 15:47) >  VENTRICLES: Global left ventricular function was normal. EF estimated was  60 %.  CORONARY VESSELS: The coronary circulation is right dominant.  LM:   --  LM: Normal.  LAD:   --  LAD: Angiography showed minor luminal irregularities with no  flow limiting lesions.  --  Proximal LAD: There was a discrete 20 % stenosis.  --  Mid LAD: There was a tubular 20 % stenosis.  CX:   --  Proximal circumflex: The vessel was moderately tortuous. There  was a discrete 50 % stenosis. There was EMILIANA grade 3 flow throughthe  vessel (brisk flow).  --  OM2: The vessel was moderately tortuous. There was a discrete 60 %  stenosis in the middle third of the vessel segment. There was EMILIANA grade 3  flow through the vessel (brisk flow).  RCA:   --  Ostial RCA: There was a discrete 40 % stenosis.  --  Mid RCA: There was a tubular 40 % stenosis in the middle third of the  vessel segment. In a second lesion, there was a 0 % stenosis at the site  of a prior stent, in the distal third of the vessel segment.  --  Distal RCA: There was a 0 % stenosis at the site of a prior stent.  COMPLICATIONS: There were no complications.  DIAGNOSTIC RECOMMENDATIONS: Medical management is recommended.  Prepared and signed by  Valdo Engel M.D.      < from: Transthoracic Echocardiogram (11.15.18 @ 13:08) >  CONCLUSIONS:  1. Mitral annular calcification, otherwise normal mitral  valve. Mild mitral regurgitation.  2. Calcified trileaflet aortic valve with decreased  opening. Peak transaortic valve gradient equals 23 mm Hg,  mean transaortic valve gradient equals 13 mm Hg, estimated  aortic valve area equals 1.1 sqcm (by continuity equation),  consistent with moderate aortic stenosis.  3. Normal left ventricular internal dimensions and wall  thicknesses.  4. Normal left ventricular systolic function. No segmental  wall motion abnormalities.  5. Severe right atrial enlargement.  6. Right ventricular enlargement with decreased right  ventricular systolic function.  Systolic flattening of the  interventricular septum, consistent with RV pressure  overload.  7. Estimated right ventricular systolic pressure equals 65  mm Hg, assuming right atrial pressure equals 10 mm Hg,  consistent with severe pulmonary hypertension.  *** No previous Echo exam.    < end of copied text >    ASSESSMENT AND PLAN:   73 year old female with HTN HLD hypothyroidism CAD s/p 2 stents placed 10/5/18 at [facility] admitted with recurrent chest pain, dyspnea and diaphoresis.  She states she has been compliant with all her meds. She denies any palpitations, syncope or near syncope. On admit, CTA chest was performed that revealed no evidence of PE or AAA although there was evidence of pulmonary edema and right sided heart failure with hepatic congestion.  Troponins and CPK c/w NSTEMI.    --cont DAPT, statin   --cardiac cath with patent stent  --Pulm follow up   --keep net neg with Lasix, change to PO in AM  --HD stable  --medical management for AS at present
INTERVAL HPI/OVERNIGHT EVENTS:    denies n/v/d/c, abdominal pain, melena or brbpr     MEDICATIONS  (STANDING):  aspirin  chewable 81 milliGRAM(s) Oral daily  buDESOnide 160 MICROgram(s)/formoterol 4.5 MICROgram(s) Inhaler 2 Puff(s) Inhalation two times a day  clopidogrel Tablet 75 milliGRAM(s) Oral daily  docusate sodium 100 milliGRAM(s) Oral three times a day  ferrous    sulfate 325 milliGRAM(s) Oral three times a day  furosemide    Tablet 20 milliGRAM(s) Oral daily  influenza   Vaccine 0.5 milliLiter(s) IntraMuscular once  levothyroxine 137 MICROGram(s) Oral daily  magnesium oxide 400 milliGRAM(s) Oral three times a day with meals  metoprolol tartrate 25 milliGRAM(s) Oral two times a day  polyethylene glycol 3350 17 Gram(s) Oral daily  senna 2 Tablet(s) Oral at bedtime  simvastatin 40 milliGRAM(s) Oral at bedtime    MEDICATIONS  (PRN):  benzocaine 15 mG/menthol 3.6 mG (Sugar-Free) Lozenge 1 Lozenge Oral every 2 hours PRN Sore Throat      Allergies    No Known Allergies    Intolerances        Review of Systems:    General:  No wt loss, fevers, chills, night sweats, fatigue   Eyes:  Good vision, no reported pain  ENT:  No sore throat, pain, runny nose, dysphagia  CV:  No pain, palpitations, hypo/hypertension  Resp:  No dyspnea, cough, tachypnea, wheezing  GI:  No pain, No nausea, No vomiting, No diarrhea, No constipation, No weight loss, No fever, No pruritis, No rectal bleeding, No melena, No dysphagia  :  No pain, bleeding, incontinence, nocturia  Muscle:  No pain, weakness  Neuro:  No weakness, tingling, memory problems  Psych:  No fatigue, insomnia, mood problems, depression  Endocrine:  No polyuria, polydypsia, cold/heat intolerance  Heme:  No petechiae, ecchymosis, easy bruisability  Skin:  No rash, tattoos, scars, edema      Vital Signs Last 24 Hrs  T(C): 36.9 (19 Nov 2018 05:08), Max: 36.9 (18 Nov 2018 21:27)  T(F): 98.5 (19 Nov 2018 05:08), Max: 98.5 (19 Nov 2018 05:08)  HR: 86 (19 Nov 2018 05:08) (83 - 86)  BP: 114/61 (19 Nov 2018 05:08) (107/74 - 122/74)  BP(mean): --  RR: 18 (19 Nov 2018 05:08) (18 - 18)  SpO2: 100% (19 Nov 2018 05:08) (97% - 100%)    PHYSICAL EXAM:    Constitutional: NAD  HEENT: EOMI, throat clear  Neck: No LAD, supple  Respiratory: CTA and P  Cardiovascular: S1 and S2, RRR, no M  Gastrointestinal: BS+, soft, NT/ND, neg HSM,  Extremities: No peripheral edema, neg clubbing, cyanosis  Vascular: 2+ peripheral pulses  Neurological: A/O x 3, no focal deficits  Psychiatric: Normal mood, normal affect  Skin: No rashes      LABS:                        11.7   6.36  )-----------( 245      ( 19 Nov 2018 04:30 )             36.7     11-19    131<L>  |  98  |  31<H>  ----------------------------<  88  4.1   |  23  |  0.89    Ca    8.6      19 Nov 2018 04:30  Mg     1.9     11-19    TPro  9.2<H>  /  Alb  2.5<L>  /  TBili  0.6  /  DBili  x   /  AST  41<H>  /  ALT  26  /  AlkPhos  106  11-19          RADIOLOGY & ADDITIONAL TESTS:
Patient is a 73y old  Female who presents with a chief complaint of Chest pain (16 Nov 2018 12:27)    SUBJECTIVE / OVERNIGHT EVENTS: no overnight events    ROS:  Resp: No cough no sputum production  CVS: No chest pain no palpitations no orthopnea  GI: no N/V/D  : no dysuria, no hematuria  Neuro: no weakness no paresthesias  Heme: No petechiae no easy bruising  Msk: No joint pain no swelling  Skin: No rash no itching        MEDICATIONS  (STANDING):  aspirin  chewable 81 milliGRAM(s) Oral daily  clopidogrel Tablet 75 milliGRAM(s) Oral daily  ferrous    sulfate 325 milliGRAM(s) Oral three times a day  influenza   Vaccine 0.5 milliLiter(s) IntraMuscular once  levothyroxine 137 MICROGram(s) Oral daily  metoprolol tartrate 25 milliGRAM(s) Oral two times a day  simvastatin 40 milliGRAM(s) Oral at bedtime    MEDICATIONS  (PRN):  benzocaine 15 mG/menthol 3.6 mG (Sugar-Free) Lozenge 1 Lozenge Oral every 2 hours PRN Sore Throat        CAPILLARY BLOOD GLUCOSE        I&O's Summary    15 Nov 2018 07:01  -  16 Nov 2018 07:00  --------------------------------------------------------  IN: 400 mL / OUT: 0 mL / NET: 400 mL        Vital Signs Last 24 Hrs  T(C): 36.5 (16 Nov 2018 05:18), Max: 36.5 (15 Nov 2018 21:37)  T(F): 97.7 (16 Nov 2018 05:18), Max: 97.7 (15 Nov 2018 21:37)  HR: 80 (16 Nov 2018 05:18) (77 - 80)  BP: 127/80 (16 Nov 2018 05:18) (124/85 - 127/80)  BP(mean): --  RR: 17 (16 Nov 2018 05:18) (17 - 18)  SpO2: 97% (16 Nov 2018 05:18) (97% - 97%)    PHYSICAL EXAM:  GENERAL: NAD, asthenic  HEAD:  Atraumatic, Normocephalic  EYES: EOMI, PERRLA, conjunctiva and sclera clear  NECK: Supple, No JVD  CHEST/LUNG: Clear to auscultation bilaterally; No wheeze  HEART: S1S2; soft ejection systolic murmur best heard at left sternal border No rubs, or gallops  ABDOMEN: Soft, Nontender, Nondistended; Bowel sounds present  EXTREMITIES:  + Peripheral Pulses, No clubbing or cyanosis, no edema  PSYCH: AO x 3,   NEUROLOGY: Alert, no focal motor or sensory deficits  SKIN: No rashes or lesions    LABS:                        11.4   6.26  )-----------( 267      ( 16 Nov 2018 05:56 )             36.2     11-16    132<L>  |  100  |  39<H>  ----------------------------<  87  4.2   |  21<L>  |  1.04    Ca    8.7      16 Nov 2018 05:56  Mg     1.9     11-16    TPro  9.3<H>  /  Alb  2.8<L>  /  TBili  0.5  /  DBili  x   /  AST  45<H>  /  ALT  29  /  AlkPhos  124<H>  11-16    PT/INR - ( 15 Nov 2018 03:30 )   PT: 13.5 SEC;   INR: 1.18          PTT - ( 15 Nov 2018 10:08 )  PTT:65.2 SEC  CARDIAC MARKERS ( 15 Nov 2018 03:30 )  x     / x     / 181 u/L / 14.50 ng/mL / x      CARDIAC MARKERS ( 14 Nov 2018 17:00 )  x     / x     / 211 u/L / 18.83 ng/mL / x                All consultant(s) notes reviewed and care discussed with other providers    Contact Number, Dr Catalan [phone]
Patient is a 73y old  Female who presents with a chief complaint of Chest pain (17 Nov 2018 10:45)      SUBJECTIVE / OVERNIGHT EVENTS: no overnight events    ROS:  Resp: No cough no sputum production  CVS: No chest pain no palpitations no orthopnea  GI: no N/V/D  : no dysuria, no hematuria  Neuro: no weakness no paresthesias  Heme: No petechiae no easy bruising  Msk: No joint pain no swelling  Skin: No rash no itching        MEDICATIONS  (STANDING):  aspirin  chewable 81 milliGRAM(s) Oral daily  clopidogrel Tablet 75 milliGRAM(s) Oral daily  docusate sodium 100 milliGRAM(s) Oral three times a day  ferrous    sulfate 325 milliGRAM(s) Oral three times a day  furosemide   Injectable 20 milliGRAM(s) IV Push daily  influenza   Vaccine 0.5 milliLiter(s) IntraMuscular once  levothyroxine 137 MICROGram(s) Oral daily  metoprolol tartrate 25 milliGRAM(s) Oral two times a day  polyethylene glycol 3350 17 Gram(s) Oral daily  senna 2 Tablet(s) Oral at bedtime  simvastatin 40 milliGRAM(s) Oral at bedtime    MEDICATIONS  (PRN):  benzocaine 15 mG/menthol 3.6 mG (Sugar-Free) Lozenge 1 Lozenge Oral every 2 hours PRN Sore Throat        CAPILLARY BLOOD GLUCOSE        I&O's Summary    16 Nov 2018 07:01  -  17 Nov 2018 07:00  --------------------------------------------------------  IN: 440 mL / OUT: 450 mL / NET: -10 mL        Vital Signs Last 24 Hrs  T(C): 36.4 (17 Nov 2018 04:43), Max: 36.6 (16 Nov 2018 16:41)  T(F): 97.6 (17 Nov 2018 04:43), Max: 97.9 (16 Nov 2018 16:41)  HR: 68 (17 Nov 2018 04:43) (68 - 88)  BP: 132/55 (17 Nov 2018 04:43) (113/84 - 132/55)  BP(mean): --  RR: 18 (17 Nov 2018 04:43) (17 - 18)  SpO2: 100% (17 Nov 2018 04:43) (95% - 100%)    PHYSICAL EXAM:  GENERAL: NAD, well-developed  HEAD:  Atraumatic, Normocephalic  EYES: EOMI, PERRLA, conjunctiva and sclera clear  NECK: Supple, No JVD  CHEST/LUNG: Clear to auscultation bilaterally; No wheeze  HEART: S1S2; soft ejection systolic murmur best heard at left sternal border No rubs, or gallops  ABDOMEN: Soft, Nontender, Nondistended; Bowel sounds present  EXTREMITIES:  + Peripheral Pulses, No clubbing or cyanosis, no edema  PSYCH: AO x 3,   NEUROLOGY: Alert, no focal motor or sensory deficits  SKIN: No rashes or lesions    LABS:                        12.0   6.69  )-----------( 258      ( 17 Nov 2018 06:00 )             37.6     11-17    132<L>  |  101  |  37<H>  ----------------------------<  94  4.2   |  22  |  0.95    Ca    9.0      17 Nov 2018 06:00  Mg     1.9     11-16    TPro  9.9<H>  /  Alb  2.7<L>  /  TBili  0.7  /  DBili  x   /  AST  40<H>  /  ALT  26  /  AlkPhos  120  11-17                All consultant(s) notes reviewed and care discussed with other providers    Contact Number, Dr Catalan [phone]
Patient is a 73y old  Female who presents with a chief complaint of Chest pain (18 Nov 2018 11:30)      Any change in ROS: Asymptomatic    MEDICATIONS  (STANDING):  aspirin  chewable 81 milliGRAM(s) Oral daily  clopidogrel Tablet 75 milliGRAM(s) Oral daily  docusate sodium 100 milliGRAM(s) Oral three times a day  ferrous    sulfate 325 milliGRAM(s) Oral three times a day  furosemide   Injectable 20 milliGRAM(s) IV Push daily  influenza   Vaccine 0.5 milliLiter(s) IntraMuscular once  levothyroxine 137 MICROGram(s) Oral daily  metoprolol tartrate 25 milliGRAM(s) Oral two times a day  polyethylene glycol 3350 17 Gram(s) Oral daily  senna 2 Tablet(s) Oral at bedtime  simvastatin 40 milliGRAM(s) Oral at bedtime    MEDICATIONS  (PRN):  benzocaine 15 mG/menthol 3.6 mG (Sugar-Free) Lozenge 1 Lozenge Oral every 2 hours PRN Sore Throat    Vital Signs Last 24 Hrs  T(C): 36.3 (18 Nov 2018 12:11), Max: 36.5 (17 Nov 2018 13:47)  T(F): 97.3 (18 Nov 2018 12:11), Max: 97.7 (17 Nov 2018 13:47)  HR: 86 (18 Nov 2018 12:11) (83 - 98)  BP: 122/74 (18 Nov 2018 12:11) (112/85 - 127/90)  BP(mean): --  RR: 18 (18 Nov 2018 12:11) (18 - 18)  SpO2: 98% (18 Nov 2018 12:11) (90% - 100%)    I&O's Summary    17 Nov 2018 07:01  -  18 Nov 2018 07:00  --------------------------------------------------------  IN: 360 mL / OUT: 0 mL / NET: 360 mL          Physical Exam:   GENERAL: NAD, well-groomed, well-developed  HEENT: BERTA/   Atraumatic, Normocephalic  ENMT: No tonsillar erythema, exudates, or enlargement; Moist mucous membranes, Good dentition, No lesions  NECK: Supple, No JVD, Normal thyroid  CHEST/LUNG: Clear to auscultaion, ; No rales, rhonchi, wheezing, or rubs  CVS: Regular rate and rhythm; No murmurs, rubs, or gallops  GI: : Soft, Nontender, Nondistended; Bowel sounds present  NERVOUS SYSTEM:  Alert & Oriented X3, Good concentration; Motor Strength 5/5 B/L upper and lower extremities; DTRs 2+ intact and symmetric  EXTREMITIES:  2+ Peripheral Pulses, No clubbing, cyanosis, or edema  LYMPH: No lymphadenopathy noted  SKIN: No rashes or lesions  ENDOCRINOLOGY: No Thyromegaly  PSYCH: Appropriate    Labs:  24, 22                            12.3   8.00  )-----------( 271      ( 18 Nov 2018 06:40 )             39.6                         12.0   6.69  )-----------( 258      ( 17 Nov 2018 06:00 )             37.6                         11.4   6.26  )-----------( 267      ( 16 Nov 2018 05:56 )             36.2                         11.6   5.81  )-----------( 264      ( 15 Nov 2018 03:30 )             37.0                         11.0   5.83  )-----------( 247      ( 14 Nov 2018 20:47 )             35.6     11-18    132<L>  |  99  |  34<H>  ----------------------------<  97  4.0   |  23  |  0.96  11-17    132<L>  |  101  |  37<H>  ----------------------------<  94  4.2   |  22  |  0.95  11-16    132<L>  |  100  |  39<H>  ----------------------------<  87  4.2   |  21<L>  |  1.04  11-15    133<L>  |  101  |  32<H>  ----------------------------<  93  4.4   |  20<L>  |  0.94    Ca    9.0      18 Nov 2018 06:40  Ca    9.0      17 Nov 2018 06:00  Mg     2.0     11-18    TPro  10.1<H>  /  Alb  2.7<L>  /  TBili  0.6  /  DBili  x   /  AST  43<H>  /  ALT  30  /  AlkPhos  116  11-18  TPro  9.9<H>  /  Alb  2.7<L>  /  TBili  0.7  /  DBili  x   /  AST  40<H>  /  ALT  26  /  AlkPhos  120  11-17  TPro  9.3<H>  /  Alb  2.8<L>  /  TBili  0.5  /  DBili  x   /  AST  45<H>  /  ALT  29  /  AlkPhos  124<H>  11-16  TPro  9.3<H>  /  Alb  2.8<L>  /  TBili  0.5  /  DBili  x   /  AST  50<H>  /  ALT  33  /  AlkPhos  149<H>  11-15    CAPILLARY BLOOD GLUCOSE    < from: CT Abdomen and Pelvis w/ IV Cont (11.17.18 @ 13:18) >  free air. No free fluid.    PERITONEUM: No ascites.  VESSELS:  The aorta is normal in caliber. Atheromatous disease of the   aorta. No aneurysm. The celiac artery, SMA, renal arteries, and KARIS are   proximally patent. The SMA is narrowed by greater than 50% at its ostium.    The portal vein is patent. The SMV is patent. The renal veins are patent    RETROPERITONEUM: No lymphadenopathy.    ABDOMINAL WALL: Within normal limits.  BONES: Within normal limits.    IMPRESSION:     1.  The heterogeneous morphology liver (nutmeg liver) can be secondary to   right heart dysfunction.  2.  No bowel obstruction.  3.  Bilateral lower lobe reticular opacities and bronchiectasis can   indicate underlying interstitial lung disease. This was better   characterized on the CT chest of 11/14/2018.  4.  Left pleural effusion.                    DUSTY RENTERIA M.D., ATTENDING RADIOLOGIST  This document has been electronically signed. Nov 17 2018  1:34PM        < end of copied text >        LIVER FUNCTIONS - ( 18 Nov 2018 06:40 )  Alb: 2.7 g/dL / Pro: 10.1 g/dL / ALK PHOS: 116 u/L / ALT: 30 u/L / AST: 43 u/L / GGT: x               D-Dimer Assay, Quantitative: 3347 ng/mL (11-14 @ 12:45)        RECENT CULTURES:    < from: CT Angio Chest w/ IV Cont (11.14.18 @ 15:14) >    PROCEDURE DATE:  Nov 14 2018         INTERPRETATION:  CLINICAL INFORMATION: Chest pain with dyspnea.    COMPARISON: Radiograph the chest 11/14/2018 at 1:35 PM.    PROCEDURE:   CT Angiography of the Chest.  90 ml of Omnipaque 350 was injected intravenously. 10 ml were discarded.  Sagittal and coronal reformats were performed as well as 3D (MIP)   reconstructions.      FINDINGS: The quality of the images are degraded by respiratory motion.    LUNGS AND LARGE AIRWAYS: Patent central airways. Basilar predominant   peripheral reticulations and tractional bronchiectasis representing mild   fibrosis. Interlobular septal thickening representing superimposed   pulmonary edema. Subsegmental atelectasis in the basilar left lower lobe.    6 mm right upper lobe nodule (series 3, image 34).    PLEURA: Small left pleural effusion.    VESSELS: No pulmonary embolism. Main pulmonary artery normal in diameter.    Multivessel coronary atherosclerosis. Coronary stents.     HEART: The heart is enlarged. The right atrium is severely dilated.   Reflux of the contrast within the hepatic veins likely representing   right-sided heart failure. Trace pericardial effusion. Aortic valve   calcifications. No thoracic aortic aneurysm.    MEDIASTINUM AND MARVIN: No lymphadenopathy.    CHEST WALL AND LOWER NECK: Within normal limits.    VISUALIZED UPPER ABDOMEN: Within normal limits.    BONES: Mild degenerative changes.    IMPRESSION:     No pulmonary embolism. No aortic aneurysm or dissection.    Severely dilated right atrium and reflux of the contrast into the hepatic   veins representing right-sided heart failure.    Mild pulmonary edema and basilar predominant interstitial lung disease.    6 mm right upper lobe nodule. Recommend follow-up in 3 months to   determine stability.      < end of copied text >      RESPIRATORY CULTURES:          Studies  Chest X-RAY  CT SCAN Chest   Venous Dopplers: LE:   CT Abdomen  Others    < from: Transthoracic Echocardiogram (11.15.18 @ 13:08) >  Aortic Root: Normal aortic root.  Aortic Valve: Calcified trileaflet aortic valve with  decreased opening. Peak transaortic valve gradient equals  23 mm Hg, mean transaortic valve gradient equals 13 mm Hg,  estimated aortic valve area equals 1.1 sqcm (by continuity  equation), consistent with moderate aortic stenosis.  Left Atrium: Normal left atrium.  LA volume index = 20  cc/m2.  Left Ventricle: Normal left ventricular systolic function.  No segmental wall motion abnormalities. Normal left  ventricular internal dimensions and wall thicknesses.  Right Heart: Severe right atrial enlargement. Right  ventricular enlargement with decreased right ventricular  systolic function.  Systolic flattening of the  interventricular septum, consistent with RV pressure  overload. Normal tricuspid valve. Moderate tricuspid  regurgitation. Normal pulmonic valve.  Mild pulmonic  regurgitation.  Pericardium/PleuraNormal pericardium with no pericardial  effusion.  Hemodynamic: Estimated right ventricular systolic pressure  equals 65 mm Hg, assuming right atrial pressure equals 10  mm Hg, consistent with severe pulmonary hypertension.  ------------------------------------------------------------------------  CONCLUSIONS:  1. Mitral annular calcification, otherwise normal mitral  valve. Mild mitral regurgitation.  2. Calcified trileaflet aortic valve with decreased  opening. Peak transaortic valve gradient equals 23 mm Hg,  mean transaortic valve gradient equals 13 mm Hg, estimated  aortic valve area equals 1.1 sqcm (by continuity equation),  consistent with moderate aortic stenosis.  3. Normal left ventricular internal dimensions and wall  thicknesses.  4. Normal left ventricular systolic function. No segmental  wall motion abnormalities.  5. Severe right atrial enlargement.  6. Right ventricular enlargement with decreased right  ventricular systolic function.  Systolic flattening of the  interventricular septum, consistent with RV pressure  overload.  7. Estimated right ventricular systolic pressure equals 65  mm Hg, assuming right atrial pressure equals 10 mm Hg,  consistent with severe pulmonary hypertension.  *** No previous Echo exam.  ------------------------------------------------------------------------  Confirmed on  11/15/2018 - 14:19:43 by Saleem Blanton M.D.  ------------------------------------------------------------------------    < end of copied text >
S: no chest pain or sob       aspirin  chewable 81 milliGRAM(s) Oral daily  benzocaine 15 mG/menthol 3.6 mG (Sugar-Free) Lozenge 1 Lozenge Oral every 2 hours PRN  buDESOnide 160 MICROgram(s)/formoterol 4.5 MICROgram(s) Inhaler 2 Puff(s) Inhalation two times a day  clopidogrel Tablet 75 milliGRAM(s) Oral daily  docusate sodium 100 milliGRAM(s) Oral three times a day  ferrous    sulfate 325 milliGRAM(s) Oral three times a day  furosemide    Tablet 20 milliGRAM(s) Oral daily  influenza   Vaccine 0.5 milliLiter(s) IntraMuscular once  levothyroxine 137 MICROGram(s) Oral daily  magnesium oxide 400 milliGRAM(s) Oral three times a day with meals  metoprolol tartrate 25 milliGRAM(s) Oral two times a day  polyethylene glycol 3350 17 Gram(s) Oral daily  senna 2 Tablet(s) Oral at bedtime  simvastatin 40 milliGRAM(s) Oral at bedtime                            11.7   6.36  )-----------( 245      ( 19 Nov 2018 04:30 )             36.7       11-19    131<L>  |  98  |  31<H>  ----------------------------<  88  4.1   |  23  |  0.89    Ca    8.6      19 Nov 2018 04:30  Mg     1.9     11-19    TPro  9.2<H>  /  Alb  2.5<L>  /  TBili  0.6  /  DBili  x   /  AST  41<H>  /  ALT  26  /  AlkPhos  106  11-19            T(C): 36.9 (11-19-18 @ 05:08), Max: 36.9 (11-18-18 @ 21:27)  HR: 86 (11-19-18 @ 05:08) (83 - 86)  BP: 114/61 (11-19-18 @ 05:08) (107/74 - 119/76)  RR: 18 (11-19-18 @ 05:08) (18 - 18)  SpO2: 100% (11-19-18 @ 05:08) (97% - 100%)  Wt(kg): --    I&O's Summary    18 Nov 2018 07:01  -  19 Nov 2018 07:00  --------------------------------------------------------  IN: 240 mL / OUT: 0 mL / NET: 240 mL    19 Nov 2018 07:01  -  19 Nov 2018 12:53  --------------------------------------------------------  IN: 120 mL / OUT: 0 mL / NET: 120 mL            Lymphatic: No obvious lymphadenopathy, No edema  Cardiovascular: Normal S1, S2, RRR, no m/r/g  Respiratory: Lungs clear to auscultation, normal effort  Gastrointestinal: Abdomen soft, ND, NT, +BS  Skin: Warm, dry, intact. No cyanosis, No rash.      DIAGNOSTIC DATA    TELEMETRY: SR    CT Angio Chest w/ IV Cont (11.14.18 @ 15:14) >  IMPRESSION:     No pulmonary embolism. No aortic aneurysm or dissection.    Severely dilated right atrium and reflux of the contrast into the hepatic   veins representing right-sided heart failure.    Mild pulmonary edema and basilar predominant interstitial lung disease.    6 mm right upper lobe nodule. Recommend follow-up in 3 months to   determine stability.      < from: Cardiac Cath Lab - Adult (11.15.18 @ 15:47) >  VENTRICLES: Global left ventricular function was normal. EF estimated was  60 %.  CORONARY VESSELS: The coronary circulation is right dominant.  LM:   --  LM: Normal.  LAD:   --  LAD: Angiography showed minor luminal irregularities with no  flow limiting lesions.  --  Proximal LAD: There was a discrete 20 % stenosis.  --  Mid LAD: There was a tubular 20 % stenosis.  CX:   --  Proximal circumflex: The vessel was moderately tortuous. There  was a discrete 50 % stenosis. There was EMILIANA grade 3 flow throughthe  vessel (brisk flow).  --  OM2: The vessel was moderately tortuous. There was a discrete 60 %  stenosis in the middle third of the vessel segment. There was EMILIANA grade 3  flow through the vessel (brisk flow).  RCA:   --  Ostial RCA: There was a discrete 40 % stenosis.  --  Mid RCA: There was a tubular 40 % stenosis in the middle third of the  vessel segment. In a second lesion, there was a 0 % stenosis at the site  of a prior stent, in the distal third of the vessel segment.  --  Distal RCA: There was a 0 % stenosis at the site of a prior stent.  COMPLICATIONS: There were no complications.  DIAGNOSTIC RECOMMENDATIONS: Medical management is recommended.  Prepared and signed by  Valdo Engel M.D.      < from: Transthoracic Echocardiogram (11.15.18 @ 13:08) >  CONCLUSIONS:  1. Mitral annular calcification, otherwise normal mitral  valve. Mild mitral regurgitation.  2. Calcified trileaflet aortic valve with decreased  opening. Peak transaortic valve gradient equals 23 mm Hg,  mean transaortic valve gradient equals 13 mm Hg, estimated  aortic valve area equals 1.1 sqcm (by continuity equation),  consistent with moderate aortic stenosis.  3. Normal left ventricular internal dimensions and wall  thicknesses.  4. Normal left ventricular systolic function. No segmental  wall motion abnormalities.  5. Severe right atrial enlargement.  6. Right ventricular enlargement with decreased right  ventricular systolic function.  Systolic flattening of the  interventricular septum, consistent with RV pressure  overload.  7. Estimated right ventricular systolic pressure equals 65  mm Hg, assuming right atrial pressure equals 10 mm Hg,  consistent with severe pulmonary hypertension.  *** No previous Echo exam.    < end of copied text >    ASSESSMENT AND PLAN:   73 year old female with HTN HLD hypothyroidism CAD s/p 2 stents placed 10/5/18 at [facility] admitted with recurrent chest pain, dyspnea and diaphoresis.  She states she has been compliant with all her meds. She denies any palpitations, syncope or near syncope. On admit, CTA chest was performed that revealed no evidence of PE or AAA although there was evidence of pulmonary edema and right sided heart failure with hepatic congestion.  Troponins and CPK c/w NSTEMI.    --cont DAPT for recent stent  --cardiac cath with patent stent and mild cad  --continue with statin and medical management of cad  --Pulm follow up for ILD  --continue with PO lasix  --no further cardiac workup needed at this time  --dc planning when ok with consultants    Carolina Ortega MD
SUBJECTIVE:  Patient with no anginal chest pain or shortness of breath  ROS otherwise negative       MEDICATIONS  (STANDING):  aspirin  chewable 81 milliGRAM(s) Oral daily  clopidogrel Tablet 75 milliGRAM(s) Oral daily  docusate sodium 100 milliGRAM(s) Oral three times a day  ferrous    sulfate 325 milliGRAM(s) Oral three times a day  influenza   Vaccine 0.5 milliLiter(s) IntraMuscular once  levothyroxine 137 MICROGram(s) Oral daily  metoprolol tartrate 25 milliGRAM(s) Oral two times a day  polyethylene glycol 3350 17 Gram(s) Oral daily  senna 2 Tablet(s) Oral at bedtime  simvastatin 40 milliGRAM(s) Oral at bedtime    MEDICATIONS  (PRN):  benzocaine 15 mG/menthol 3.6 mG (Sugar-Free) Lozenge 1 Lozenge Oral every 2 hours PRN Sore Throat      LABS:                        11.4   6.26  )-----------( 267      ( 16 Nov 2018 05:56 )             36.2       11-16    132<L>  |  100  |  39<H>  ----------------------------<  87  4.2   |  21<L>  |  1.04    Ca    8.7      16 Nov 2018 05:56  Mg     1.9     11-16    TPro  9.3<H>  /  Alb  2.8<L>  /  TBili  0.5  /  DBili  x   /  AST  45<H>  /  ALT  29  /  AlkPhos  124<H>  11-16        CARDIAC MARKERS ( 15 Nov 2018 03:30 )  x     / x     / 181 u/L / 14.50 ng/mL / x      CARDIAC MARKERS ( 14 Nov 2018 17:00 )  x     / x     / 211 u/L / 18.83 ng/mL / x          Serum Pro-Brain Natriuretic Peptide: 59172 pg/mL (11-14 @ 12:45)      PHYSICAL EXAM:  Vital Signs Last 24 Hrs  T(C): 36.5 (16 Nov 2018 05:18), Max: 36.5 (15 Nov 2018 21:37)  T(F): 97.7 (16 Nov 2018 05:18), Max: 97.7 (15 Nov 2018 21:37)  HR: 80 (16 Nov 2018 05:18) (77 - 80)  BP: 127/80 (16 Nov 2018 05:18) (124/85 - 127/80)  BP(mean): --  RR: 17 (16 Nov 2018 05:18) (17 - 18)  SpO2: 97% (16 Nov 2018 05:18) (97% - 97%)    HEENT: Normal Oral mucosa, PERRL, EOMI  Lymphatic: No obvious lymphadenopathy, No edema  Cardiovascular: Normal S1S2, No JVD, 1/6 YIFAN, Peripheral pulses palpable 2+ B/L  Respiratory: Lungs clear to auscultation, normal effort  Gastrointestinal: Abdomen soft, ND, NT, +BS  Skin: Warm, dry, intact. No cyanosis, No rash.  Musculoskeletal: Normal ROM, normal strength  Psychiatric: Appropriate Mood and Affect      DIAGNOSTIC DATA  TELEMETRY: NSR no events    RADIOLOGY:    CT Angio Chest w/ IV Cont (11.14.18 @ 15:14) >  IMPRESSION:     No pulmonary embolism. No aortic aneurysm or dissection.    Severely dilated right atrium and reflux of the contrast into the hepatic   veins representing right-sided heart failure.    Mild pulmonary edema and basilar predominant interstitial lung disease.    6 mm right upper lobe nodule. Recommend follow-up in 3 months to   determine stability.      < from: Cardiac Cath Lab - Adult (11.15.18 @ 15:47) >  VENTRICLES: Global left ventricular function was normal. EF estimated was  60 %.  CORONARY VESSELS: The coronary circulation is right dominant.  LM:   --  LM: Normal.  LAD:   --  LAD: Angiography showed minor luminal irregularities with no  flow limiting lesions.  --  Proximal LAD: There was a discrete 20 % stenosis.  --  Mid LAD: There was a tubular 20 % stenosis.  CX:   --  Proximal circumflex: The vessel was moderately tortuous. There  was a discrete 50 % stenosis. There was EMILIANA grade 3 flow throughthe  vessel (brisk flow).  --  OM2: The vessel was moderately tortuous. There was a discrete 60 %  stenosis in the middle third of the vessel segment. There was EMILIANA grade 3  flow through the vessel (brisk flow).  RCA:   --  Ostial RCA: There was a discrete 40 % stenosis.  --  Mid RCA: There was a tubular 40 % stenosis in the middle third of the  vessel segment. In a second lesion, there was a 0 % stenosis at the site  of a prior stent, in the distal third of the vessel segment.  --  Distal RCA: There was a 0 % stenosis at the site of a prior stent.  COMPLICATIONS: There were no complications.  DIAGNOSTIC RECOMMENDATIONS: Medical management is recommended.  Prepared and signed by  Valdo Engel M.D.      < from: Transthoracic Echocardiogram (11.15.18 @ 13:08) >  CONCLUSIONS:  1. Mitral annular calcification, otherwise normal mitral  valve. Mild mitral regurgitation.  2. Calcified trileaflet aortic valve with decreased  opening. Peak transaortic valve gradient equals 23 mm Hg,  mean transaortic valve gradient equals 13 mm Hg, estimated  aortic valve area equals 1.1 sqcm (by continuity equation),  consistent with moderate aortic stenosis.  3. Normal left ventricular internal dimensions and wall  thicknesses.  4. Normal left ventricular systolic function. No segmental  wall motion abnormalities.  5. Severe right atrial enlargement.  6. Right ventricular enlargement with decreased right  ventricular systolic function.  Systolic flattening of the  interventricular septum, consistent with RV pressure  overload.  7. Estimated right ventricular systolic pressure equals 65  mm Hg, assuming right atrial pressure equals 10 mm Hg,  consistent with severe pulmonary hypertension.  *** No previous Echo exam.    < end of copied text >    ASSESSMENT AND PLAN:   73 year old female with HTN HLD hypothyroidism CAD s/p 2 stents placed 10/5/18 at [facility] admitted with recurrent chest pain, dyspnea and diaphoresis.  She states she has been compliant with all her meds. She denies any palpitations, syncope or near syncope. On admit, CTA chest was performed that revealed no evidence of PE or AAA although there was evidence of pulmonary edema and right sided heart failure with hepatic congestion.  Troponins and CPK c/w NSTEMI.    --  patient s/l [facility] 11/15 with patent RCA stents and otherwise non obstructive CAD      - c/w asa/plavix/statin/bb  -- tolerated procedure well  -- TTE reviewed - normal LV function ; mod AS (1.1cm2) , severe pulm HTN and evidence of RV overload, mod TR  -- Pulm evaluation for pulm HTN and possible interstitial lung disease and small lung nodule - full PFT and PSG   -- Dyspnea - elevated BNP with mild pulm edema on CT chest - will start Lasix 20mg IV daily   -- Weight loss of 35 pounds over the last year (unintentional) /elevated LFTS - GI eval appreciated                 - CT a/p with iv contrast tomorrow if creatinine stable                - elevated LFTS likely secondary to congestive hepatopathy   -- HD stable  -- final recs pending above
SUBJECTIVE:  Patient with no anginal chest pain or shortness of breath  ROS otherwise negative     aspirin  chewable 81 milliGRAM(s) Oral daily  benzocaine 15 mG/menthol 3.6 mG (Sugar-Free) Lozenge 1 Lozenge Oral every 2 hours PRN  clopidogrel Tablet 75 milliGRAM(s) Oral daily  docusate sodium 100 milliGRAM(s) Oral three times a day  ferrous    sulfate 325 milliGRAM(s) Oral three times a day  furosemide   Injectable 20 milliGRAM(s) IV Push daily  influenza   Vaccine 0.5 milliLiter(s) IntraMuscular once  levothyroxine 137 MICROGram(s) Oral daily  metoprolol tartrate 25 milliGRAM(s) Oral two times a day  polyethylene glycol 3350 17 Gram(s) Oral daily  senna 2 Tablet(s) Oral at bedtime  simvastatin 40 milliGRAM(s) Oral at bedtime                            11.4   6.26  )-----------( 267      ( 16 Nov 2018 05:56 )             36.2       Hemoglobin: 11.4 g/dL (11-16 @ 05:56)  Hemoglobin: 11.6 g/dL (11-15 @ 03:30)  Hemoglobin: 11.0 g/dL (11-14 @ 20:47)  Hemoglobin: 11.0 g/dL (11-14 @ 12:45)      11-16    132<L>  |  100  |  39<H>  ----------------------------<  87  4.2   |  21<L>  |  1.04    Ca    8.7      16 Nov 2018 05:56  Mg     1.9     11-16    TPro  9.3<H>  /  Alb  2.8<L>  /  TBili  0.5  /  DBili  x   /  AST  45<H>  /  ALT  29  /  AlkPhos  124<H>  11-16    Creatinine Trend: 1.04<--, 0.94<--, 0.77<--    COAGS:     CARDIAC MARKERS ( 15 Nov 2018 03:30 )  x     / x     / 181 u/L / 14.50 ng/mL / x      CARDIAC MARKERS ( 14 Nov 2018 17:00 )  x     / x     / 211 u/L / 18.83 ng/mL / x            T(C): 36.4 (11-17-18 @ 04:43), Max: 36.6 (11-16-18 @ 16:41)  HR: 68 (11-17-18 @ 04:43) (68 - 88)  BP: 132/55 (11-17-18 @ 04:43) (113/84 - 132/55)  RR: 18 (11-17-18 @ 04:43) (17 - 18)  SpO2: 100% (11-17-18 @ 04:43) (95% - 100%)  Wt(kg): --    I&O's Summary    15 Nov 2018 07:01  -  16 Nov 2018 07:00  --------------------------------------------------------  IN: 400 mL / OUT: 0 mL / NET: 400 mL    16 Nov 2018 07:01  -  17 Nov 2018 06:14  --------------------------------------------------------  IN: 440 mL / OUT: 450 mL / NET: -10 mL      HEENT: Normal Oral mucosa, PERRL, EOMI  Lymphatic: No obvious lymphadenopathy, No edema  Cardiovascular: Normal S1S2, No JVD, 1/6 YIFAN, Peripheral pulses palpable 2+ B/L  Respiratory: Lungs clear to auscultation, normal effort  Gastrointestinal: Abdomen soft, ND, NT, +BS  Skin: Warm, dry, intact. No cyanosis, No rash.  Musculoskeletal: Normal ROM, normal strength  Psychiatric: Appropriate Mood and Affect      DIAGNOSTIC DATA  TELEMETRY: NSR no events    RADIOLOGY:    CT Angio Chest w/ IV Cont (11.14.18 @ 15:14) >  IMPRESSION:     No pulmonary embolism. No aortic aneurysm or dissection.    Severely dilated right atrium and reflux of the contrast into the hepatic   veins representing right-sided heart failure.    Mild pulmonary edema and basilar predominant interstitial lung disease.    6 mm right upper lobe nodule. Recommend follow-up in 3 months to   determine stability.      < from: Cardiac Cath Lab - Adult (11.15.18 @ 15:47) >  VENTRICLES: Global left ventricular function was normal. EF estimated was  60 %.  CORONARY VESSELS: The coronary circulation is right dominant.  LM:   --  LM: Normal.  LAD:   --  LAD: Angiography showed minor luminal irregularities with no  flow limiting lesions.  --  Proximal LAD: There was a discrete 20 % stenosis.  --  Mid LAD: There was a tubular 20 % stenosis.  CX:   --  Proximal circumflex: The vessel was moderately tortuous. There  was a discrete 50 % stenosis. There was EMILIANA grade 3 flow throughthe  vessel (brisk flow).  --  OM2: The vessel was moderately tortuous. There was a discrete 60 %  stenosis in the middle third of the vessel segment. There was EMILIANA grade 3  flow through the vessel (brisk flow).  RCA:   --  Ostial RCA: There was a discrete 40 % stenosis.  --  Mid RCA: There was a tubular 40 % stenosis in the middle third of the  vessel segment. In a second lesion, there was a 0 % stenosis at the site  of a prior stent, in the distal third of the vessel segment.  --  Distal RCA: There was a 0 % stenosis at the site of a prior stent.  COMPLICATIONS: There were no complications.  DIAGNOSTIC RECOMMENDATIONS: Medical management is recommended.  Prepared and signed by  Valdo Engel M.D.      < from: Transthoracic Echocardiogram (11.15.18 @ 13:08) >  CONCLUSIONS:  1. Mitral annular calcification, otherwise normal mitral  valve. Mild mitral regurgitation.  2. Calcified trileaflet aortic valve with decreased  opening. Peak transaortic valve gradient equals 23 mm Hg,  mean transaortic valve gradient equals 13 mm Hg, estimated  aortic valve area equals 1.1 sqcm (by continuity equation),  consistent with moderate aortic stenosis.  3. Normal left ventricular internal dimensions and wall  thicknesses.  4. Normal left ventricular systolic function. No segmental  wall motion abnormalities.  5. Severe right atrial enlargement.  6. Right ventricular enlargement with decreased right  ventricular systolic function.  Systolic flattening of the  interventricular septum, consistent with RV pressure  overload.  7. Estimated right ventricular systolic pressure equals 65  mm Hg, assuming right atrial pressure equals 10 mm Hg,  consistent with severe pulmonary hypertension.  *** No previous Echo exam.    < end of copied text >    ASSESSMENT AND PLAN:   73 year old female with HTN HLD hypothyroidism CAD s/p 2 stents placed 10/5/18 at [facility] admitted with recurrent chest pain, dyspnea and diaphoresis.  She states she has been compliant with all her meds. She denies any palpitations, syncope or near syncope. On admit, CTA chest was performed that revealed no evidence of PE or AAA although there was evidence of pulmonary edema and right sided heart failure with hepatic congestion.  Troponins and CPK c/w NSTEMI.    cont DAPT, statin   HR stable on BB  cardiac cath with patent stent  Pulm follow up   keep net neg wtih Lasix at presetn   daily CMP , monitor lytes and LFT , ( trending down )   HD stable  medical management for AS at present   D/W Dr Cortez
[facility]  Albert Sandy PA-C  [address] [place] [address]  [phone]      INTERVAL HPI/OVERNIGHT EVENTS:    no new events     MEDICATIONS  (STANDING):  aspirin  chewable 81 milliGRAM(s) Oral daily  clopidogrel Tablet 75 milliGRAM(s) Oral daily  docusate sodium 100 milliGRAM(s) Oral three times a day  ferrous    sulfate 325 milliGRAM(s) Oral three times a day  furosemide   Injectable 20 milliGRAM(s) IV Push daily  influenza   Vaccine 0.5 milliLiter(s) IntraMuscular once  levothyroxine 137 MICROGram(s) Oral daily  metoprolol tartrate 25 milliGRAM(s) Oral two times a day  polyethylene glycol 3350 17 Gram(s) Oral daily  senna 2 Tablet(s) Oral at bedtime  simvastatin 40 milliGRAM(s) Oral at bedtime    MEDICATIONS  (PRN):  benzocaine 15 mG/menthol 3.6 mG (Sugar-Free) Lozenge 1 Lozenge Oral every 2 hours PRN Sore Throat      Allergies    No Known Allergies    Intolerances        ROS:   General:  No wt loss, fevers, chills, night sweats, fatigue,   Eyes:  Good vision, no reported pain  ENT:  No sore throat, pain, runny nose, dysphagia  CV:  No pain, palpitations, hypo/hypertension  Resp:  No dyspnea, cough, tachypnea, wheezing  GI:  No pain, No nausea, No vomiting, No diarrhea, No constipation, No weight loss, No fever, No pruritis, No rectal bleeding, No tarry stools, No dysphagia,  :  No pain, bleeding, incontinence, nocturia  Muscle:  No pain, weakness  Neuro:  No weakness, tingling, memory problems  Psych:  No fatigue, insomnia, mood problems, depression  Endocrine:  No polyuria, polydipsia, cold/heat intolerance  Heme:  No petechiae, ecchymosis, easy bruisability  Skin:  No rash, tattoos, scars, edema      PHYSICAL EXAM:   Vital Signs:  Vital Signs Last 24 Hrs  T(C): 36.5 (17 Nov 2018 13:47), Max: 36.6 (16 Nov 2018 16:41)  T(F): 97.7 (17 Nov 2018 13:47), Max: 97.9 (16 Nov 2018 16:41)  HR: 90 (17 Nov 2018 13:47) (68 - 90)  BP: 123/86 (17 Nov 2018 13:47) (113/84 - 132/55)  BP(mean): --  RR: 18 (17 Nov 2018 13:47) (17 - 18)  SpO2: 90% (17 Nov 2018 13:47) (90% - 100%)  Daily     Daily     GENERAL:  Appears stated age, well-groomed, well-nourished, no distress  HEENT:  NC/AT,  conjunctivae clear and pink, no thyromegaly, nodules, adenopathy, no JVD, sclera -anicteric  CHEST:  Full & symmetric excursion, no increased effort, breath sounds clear  HEART:  Regular rhythm, S1, S2, no murmur/rub/S3/S4, no abdominal bruit, no edema  ABDOMEN:  Soft, non-tender, non-distended, normoactive bowel sounds,  no masses ,no hepato-splenomegaly, no signs of chronic liver disease  EXTEREMITIES:  no cyanosis,clubbing or edema  SKIN:  No rash/erythema/ecchymoses/petechiae/wounds/abscess/warm/dry  NEURO:  Alert, oriented, no asterixis, no tremor, no encephalopathy      LABS:                        12.0   6.69  )-----------( 258      ( 17 Nov 2018 06:00 )             37.6     11-17    132<L>  |  101  |  37<H>  ----------------------------<  94  4.2   |  22  |  0.95    Ca    9.0      17 Nov 2018 06:00  Mg     1.9     11-16    TPro  9.9<H>  /  Alb  2.7<L>  /  TBili  0.7  /  DBili  x   /  AST  40<H>  /  ALT  26  /  AlkPhos  120  11-17          RADIOLOGY & ADDITIONAL TESTS:
[facility]  Albert Sandy PA-C  [address] [place] [address]  [phone]      INTERVAL HPI/OVERNIGHT EVENTS:    no new events   ct shows no malignancy  shows signs of rhf and interstitial lung disease     MEDICATIONS  (STANDING):  aspirin  chewable 81 milliGRAM(s) Oral daily  clopidogrel Tablet 75 milliGRAM(s) Oral daily  docusate sodium 100 milliGRAM(s) Oral three times a day  ferrous    sulfate 325 milliGRAM(s) Oral three times a day  furosemide   Injectable 20 milliGRAM(s) IV Push daily  influenza   Vaccine 0.5 milliLiter(s) IntraMuscular once  levothyroxine 137 MICROGram(s) Oral daily  metoprolol tartrate 25 milliGRAM(s) Oral two times a day  polyethylene glycol 3350 17 Gram(s) Oral daily  senna 2 Tablet(s) Oral at bedtime  simvastatin 40 milliGRAM(s) Oral at bedtime    MEDICATIONS  (PRN):  benzocaine 15 mG/menthol 3.6 mG (Sugar-Free) Lozenge 1 Lozenge Oral every 2 hours PRN Sore Throat      Allergies    No Known Allergies    Intolerances        ROS:   General:  No wt loss, fevers, chills, night sweats, fatigue,   Eyes:  Good vision, no reported pain  ENT:  No sore throat, pain, runny nose, dysphagia  CV:  No pain, palpitations, hypo/hypertension  Resp:  No dyspnea, cough, tachypnea, wheezing  GI:  No pain, No nausea, No vomiting, No diarrhea, No constipation, No weight loss, No fever, No pruritis, No rectal bleeding, No tarry stools, No dysphagia,  :  No pain, bleeding, incontinence, nocturia  Muscle:  No pain, weakness  Neuro:  No weakness, tingling, memory problems  Psych:  No fatigue, insomnia, mood problems, depression  Endocrine:  No polyuria, polydipsia, cold/heat intolerance  Heme:  No petechiae, ecchymosis, easy bruisability  Skin:  No rash, tattoos, scars, edema      PHYSICAL EXAM:   Vital Signs:  Vital Signs Last 24 Hrs  T(C): 36.5 (17 Nov 2018 13:47), Max: 36.6 (16 Nov 2018 16:41)  T(F): 97.7 (17 Nov 2018 13:47), Max: 97.9 (16 Nov 2018 16:41)  HR: 90 (17 Nov 2018 13:47) (68 - 90)  BP: 123/86 (17 Nov 2018 13:47) (113/84 - 132/55)  BP(mean): --  RR: 18 (17 Nov 2018 13:47) (17 - 18)  SpO2: 90% (17 Nov 2018 13:47) (90% - 100%)  Daily     Daily     GENERAL:  Appears stated age, well-groomed, well-nourished, no distress  HEENT:  NC/AT,  conjunctivae clear and pink, no thyromegaly, nodules, adenopathy, no JVD, sclera -anicteric  CHEST:  Full & symmetric excursion, no increased effort, breath sounds clear  HEART:  Regular rhythm, S1, S2, no murmur/rub/S3/S4, no abdominal bruit, no edema  ABDOMEN:  Soft, non-tender, non-distended, normoactive bowel sounds,  no masses ,no hepato-splenomegaly, no signs of chronic liver disease  EXTEREMITIES:  no cyanosis,clubbing or edema  SKIN:  No rash/erythema/ecchymoses/petechiae/wounds/abscess/warm/dry  NEURO:  Alert, oriented, no asterixis, no tremor, no encephalopathy      LABS:                        12.0   6.69  )-----------( 258      ( 17 Nov 2018 06:00 )             37.6     11-17    132<L>  |  101  |  37<H>  ----------------------------<  94  4.2   |  22  |  0.95    Ca    9.0      17 Nov 2018 06:00  Mg     1.9     11-16    TPro  9.9<H>  /  Alb  2.7<L>  /  TBili  0.7  /  DBili  x   /  AST  40<H>  /  ALT  26  /  AlkPhos  120  11-17          RADIOLOGY & ADDITIONAL TESTS:
Patient is a 73y old  Female who presents with a chief complaint of Chest pain (18 Nov 2018 14:02)      SUBJECTIVE / OVERNIGHT EVENTS: no overnight events    ROS:  Resp: No cough no sputum production  CVS: No chest pain no palpitations no orthopnea  GI: no N/V/D  : no dysuria, no hematuria  Neuro: no weakness no paresthesias  Heme: No petechiae no easy bruising  Msk: No joint pain no swelling  Skin: No rash no itching        MEDICATIONS  (STANDING):  aspirin  chewable 81 milliGRAM(s) Oral daily  buDESOnide 160 MICROgram(s)/formoterol 4.5 MICROgram(s) Inhaler 2 Puff(s) Inhalation two times a day  clopidogrel Tablet 75 milliGRAM(s) Oral daily  docusate sodium 100 milliGRAM(s) Oral three times a day  ferrous    sulfate 325 milliGRAM(s) Oral three times a day  furosemide    Tablet 20 milliGRAM(s) Oral daily  influenza   Vaccine 0.5 milliLiter(s) IntraMuscular once  levothyroxine 137 MICROGram(s) Oral daily  magnesium oxide 400 milliGRAM(s) Oral three times a day with meals  metoprolol tartrate 25 milliGRAM(s) Oral two times a day  polyethylene glycol 3350 17 Gram(s) Oral daily  senna 2 Tablet(s) Oral at bedtime  simvastatin 40 milliGRAM(s) Oral at bedtime    MEDICATIONS  (PRN):  benzocaine 15 mG/menthol 3.6 mG (Sugar-Free) Lozenge 1 Lozenge Oral every 2 hours PRN Sore Throat        CAPILLARY BLOOD GLUCOSE        I&O's Summary    18 Nov 2018 07:01  -  19 Nov 2018 07:00  --------------------------------------------------------  IN: 240 mL / OUT: 0 mL / NET: 240 mL    19 Nov 2018 07:01  -  19 Nov 2018 11:01  --------------------------------------------------------  IN: 120 mL / OUT: 0 mL / NET: 120 mL        Vital Signs Last 24 Hrs  T(C): 36.9 (19 Nov 2018 05:08), Max: 36.9 (18 Nov 2018 21:27)  T(F): 98.5 (19 Nov 2018 05:08), Max: 98.5 (19 Nov 2018 05:08)  HR: 86 (19 Nov 2018 05:08) (83 - 86)  BP: 114/61 (19 Nov 2018 05:08) (107/74 - 122/74)  BP(mean): --  RR: 18 (19 Nov 2018 05:08) (18 - 18)  SpO2: 100% (19 Nov 2018 05:08) (97% - 100%)    PHYSICAL EXAM:  GENERAL: NAD, well-developed  HEAD:  Atraumatic, Normocephalic  EYES: EOMI, PERRLA, conjunctiva and sclera clear  NECK: Supple, No JVD  CHEST/LUNG: Clear to auscultation bilaterally; No wheeze  HEART: S1S2; soft ejection systolic murmur best heard at left sternal border No rubs, or gallops  ABDOMEN: Soft, Nontender, Nondistended; Bowel sounds present  EXTREMITIES:  + Peripheral Pulses, No clubbing or cyanosis, no edema  PSYCH: AO x 3,   NEUROLOGY: Alert, no focal motor or sensory deficits  SKIN: No rashes or lesions    LABS:                        11.7   6.36  )-----------( 245      ( 19 Nov 2018 04:30 )             36.7     11-19    131<L>  |  98  |  31<H>  ----------------------------<  88  4.1   |  23  |  0.89    Ca    8.6      19 Nov 2018 04:30  Mg     1.9     11-19    TPro  9.2<H>  /  Alb  2.5<L>  /  TBili  0.6  /  DBili  x   /  AST  41<H>  /  ALT  26  /  AlkPhos  106  11-19                All consultant(s) notes reviewed and care discussed with other providers    Contact Number, Dr Catalan [phone]
Patient is a 73y old  Female who presents with a chief complaint of Chest pain (19 Nov 2018 12:52)      Any change in ROS:   Doing ok : no SOB   MEDICATIONS  (STANDING):  aspirin  chewable 81 milliGRAM(s) Oral daily  buDESOnide 160 MICROgram(s)/formoterol 4.5 MICROgram(s) Inhaler 2 Puff(s) Inhalation two times a day  clopidogrel Tablet 75 milliGRAM(s) Oral daily  docusate sodium 100 milliGRAM(s) Oral three times a day  ferrous    sulfate 325 milliGRAM(s) Oral three times a day  furosemide    Tablet 20 milliGRAM(s) Oral daily  influenza   Vaccine 0.5 milliLiter(s) IntraMuscular once  levothyroxine 137 MICROGram(s) Oral daily  magnesium oxide 400 milliGRAM(s) Oral three times a day with meals  metoprolol tartrate 25 milliGRAM(s) Oral two times a day  polyethylene glycol 3350 17 Gram(s) Oral daily  senna 2 Tablet(s) Oral at bedtime  simvastatin 40 milliGRAM(s) Oral at bedtime    MEDICATIONS  (PRN):  benzocaine 15 mG/menthol 3.6 mG (Sugar-Free) Lozenge 1 Lozenge Oral every 2 hours PRN Sore Throat    Vital Signs Last 24 Hrs  T(C): 36.9 (19 Nov 2018 05:08), Max: 36.9 (18 Nov 2018 21:27)  T(F): 98.5 (19 Nov 2018 05:08), Max: 98.5 (19 Nov 2018 05:08)  HR: 86 (19 Nov 2018 05:08) (83 - 86)  BP: 114/61 (19 Nov 2018 05:08) (107/74 - 119/76)  BP(mean): --  RR: 18 (19 Nov 2018 05:08) (18 - 18)  SpO2: 100% (19 Nov 2018 05:08) (97% - 100%)    I&O's Summary    18 Nov 2018 07:01  -  19 Nov 2018 07:00  --------------------------------------------------------  IN: 240 mL / OUT: 0 mL / NET: 240 mL    19 Nov 2018 07:01  -  19 Nov 2018 13:14  --------------------------------------------------------  IN: 120 mL / OUT: 0 mL / NET: 120 mL          Physical Exam:   GENERAL: NAD, well-groomed, well-developed  HEENT: BERTA/   Atraumatic, Normocephalic  ENMT: No tonsillar erythema, exudates, or enlargement; Moist mucous membranes, Good dentition, No lesions  NECK: Supple, No JVD, Normal thyroid  CHEST/LUNG: Clear to auscultaion, ; No rales, rhonchi, wheezing, or rubs  CVS: Regular rate and rhythm; No murmurs, rubs, or gallops  GI: : Soft, Nontender, Nondistended; Bowel sounds present  NERVOUS SYSTEM:  Alert & Oriented X3  EXTREMITIES:  2+ Peripheral Pulses, No clubbing, cyanosis, or edema  LYMPH: No lymphadenopathy noted  SKIN: No rashes or lesions  ENDOCRINOLOGY: No Thyromegaly  PSYCH: Appropriate    Labs:  24, 22                            11.7   6.36  )-----------( 245      ( 19 Nov 2018 04:30 )             36.7                         12.3   8.00  )-----------( 271      ( 18 Nov 2018 06:40 )             39.6                         12.0   6.69  )-----------( 258      ( 17 Nov 2018 06:00 )             37.6                         11.4   6.26  )-----------( 267      ( 16 Nov 2018 05:56 )             36.2     11-19    131<L>  |  98  |  31<H>  ----------------------------<  88  4.1   |  23  |  0.89  11-18    132<L>  |  99  |  34<H>  ----------------------------<  97  4.0   |  23  |  0.96  11-17    132<L>  |  101  |  37<H>  ----------------------------<  94  4.2   |  22  |  0.95  11-16    132<L>  |  100  |  39<H>  ----------------------------<  87  4.2   |  21<L>  |  1.04    Ca    8.6      19 Nov 2018 04:30  Ca    9.0      18 Nov 2018 06:40  Mg     1.9     11-19  Mg     2.0     11-18    TPro  9.2<H>  /  Alb  2.5<L>  /  TBili  0.6  /  DBili  x   /  AST  41<H>  /  ALT  26  /  AlkPhos  106  11-19  TPro  10.1<H>  /  Alb  2.7<L>  /  TBili  0.6  /  DBili  x   /  AST  43<H>  /  ALT  30  /  AlkPhos  116  11-18  TPro  9.9<H>  /  Alb  2.7<L>  /  TBili  0.7  /  DBili  x   /  AST  40<H>  /  ALT  26  /  AlkPhos  120  11-17  TPro  9.3<H>  /  Alb  2.8<L>  /  TBili  0.5  /  DBili  x   /  AST  45<H>  /  ALT  29  /  AlkPhos  124<H>  11-16    CAPILLARY BLOOD GLUCOSE          LIVER FUNCTIONS - ( 19 Nov 2018 04:30 )  Alb: 2.5 g/dL / Pro: 9.2 g/dL / ALK PHOS: 106 u/L / ALT: 26 u/L / AST: 41 u/L / GGT: x               D-Dimer Assay, Quantitative: 3347 ng/mL (11-14 @ 12:45)        RECENT CULTURES:        RESPIRATORY CULTURES:      < from: CT Angio Chest w/ IV Cont (11.14.18 @ 15:14) >  LUNGS AND LARGE AIRWAYS: Patent central airways. Basilar predominant   peripheral reticulations and tractional bronchiectasis representing mild   fibrosis. Interlobular septal thickening representing superimposed   pulmonary edema. Subsegmental atelectasis in the basilar left lower lobe.    6 mm right upper lobe nodule (series 3, image 34).    PLEURA: Small left pleural effusion.    VESSELS: No pulmonary embolism. Main pulmonary artery normal in diameter.    Multivessel coronary atherosclerosis. Coronary stents.     HEART: The heart is enlarged. The right atrium is severely dilated.   Reflux of the contrast within the hepatic veins likely representing   right-sided heart failure. Trace pericardial effusion. Aortic valve   calcifications. No thoracic aortic aneurysm.    MEDIASTINUM AND MARVIN: No lymphadenopathy.    CHEST WALL AND LOWER NECK: Within normal limits.    VISUALIZED UPPER ABDOMEN: Within normal limits.    BONES: Mild degenerative changes.    IMPRESSION:     No pulmonary embolism. No aortic aneurysm or dissection.    Severely dilated right atrium and reflux of the contrast into the hepatic   veins representing right-sided heart failure.    Mild pulmonary edema and basilar predominant interstitial lung disease.    6 mm right upper lobe nodule. Recommend follow-up in 3 months to   determine stability.                  DEBBIE ELLINGTON M.D., RADIOLOGY RESIDENT  This document has been electronically signed.  MATTHEW MCKEON M.D., ATTENDING RADIOLOGIST  This document has been electronically signed. Nov 14 2018  4:10PM    < end of copied text >      Studies  Chest X-RAY  CT SCAN Chest   Venous Dopplers: LE:   CT Abdomen  Others
Patient is a 73y old  Female who presents with a chief complaint of Chest pain (17 Nov 2018 06:13)      Any change in ROS: Lying in bed with no SOB    MEDICATIONS  (STANDING):  aspirin  chewable 81 milliGRAM(s) Oral daily  clopidogrel Tablet 75 milliGRAM(s) Oral daily  docusate sodium 100 milliGRAM(s) Oral three times a day  ferrous    sulfate 325 milliGRAM(s) Oral three times a day  furosemide   Injectable 20 milliGRAM(s) IV Push daily  influenza   Vaccine 0.5 milliLiter(s) IntraMuscular once  levothyroxine 137 MICROGram(s) Oral daily  metoprolol tartrate 25 milliGRAM(s) Oral two times a day  polyethylene glycol 3350 17 Gram(s) Oral daily  senna 2 Tablet(s) Oral at bedtime  simvastatin 40 milliGRAM(s) Oral at bedtime    MEDICATIONS  (PRN):  benzocaine 15 mG/menthol 3.6 mG (Sugar-Free) Lozenge 1 Lozenge Oral every 2 hours PRN Sore Throat    Vital Signs Last 24 Hrs  T(C): 36.4 (17 Nov 2018 04:43), Max: 36.6 (16 Nov 2018 16:41)  T(F): 97.6 (17 Nov 2018 04:43), Max: 97.9 (16 Nov 2018 16:41)  HR: 68 (17 Nov 2018 04:43) (68 - 88)  BP: 132/55 (17 Nov 2018 04:43) (113/84 - 132/55)  BP(mean): --  RR: 18 (17 Nov 2018 04:43) (17 - 18)  SpO2: 100% (17 Nov 2018 04:43) (95% - 100%)    I&O's Summary    16 Nov 2018 07:01  -  17 Nov 2018 07:00  --------------------------------------------------------  IN: 440 mL / OUT: 450 mL / NET: -10 mL          Physical Exam:   GENERAL:Thin cachectic  HEENT: BERTA/   Atraumatic, Normocephalic  ENMT: No tonsillar erythema, exudates, or enlargement; Moist mucous membranes, Good dentition, No lesions  NECK: Supple, No JVD, Normal thyroid  CHEST/LUNG: Clear to auscultaion  CVS: Regular rate and rhythm; No murmurs, rubs, or gallops  GI: : Soft, Nontender, Nondistended; Bowel sounds present  NERVOUS SYSTEM:  Alert & Oriented X3,  EXTREMITIES:  2+ Peripheral Pulses, No clubbing, cyanosis, or edema  LYMPH: No lymphadenopathy noted  SKIN: No rashes or lesions  ENDOCRINOLOGY: No Thyromegaly  PSYCH: Appropriate    Labs:  24, 22                            12.0   6.69  )-----------( 258      ( 17 Nov 2018 06:00 )             37.6                         11.4   6.26  )-----------( 267      ( 16 Nov 2018 05:56 )             36.2                         11.6   5.81  )-----------( 264      ( 15 Nov 2018 03:30 )             37.0                         11.0   5.83  )-----------( 247      ( 14 Nov 2018 20:47 )             35.6                         11.0   7.56  )-----------( 253      ( 14 Nov 2018 12:45 )             36.1     11-17    132<L>  |  101  |  37<H>  ----------------------------<  94  4.2   |  22  |  0.95  11-16    132<L>  |  100  |  39<H>  ----------------------------<  87  4.2   |  21<L>  |  1.04  11-15    133<L>  |  101  |  32<H>  ----------------------------<  93  4.4   |  20<L>  |  0.94  11-14    132<L>  |  101  |  26<H>  ----------------------------<  72  4.3   |  21<L>  |  0.77    Ca    9.0      17 Nov 2018 06:00  Ca    8.7      16 Nov 2018 05:56  Mg     1.9     11-16    TPro  9.9<H>  /  Alb  2.7<L>  /  TBili  0.7  /  DBili  x   /  AST  40<H>  /  ALT  26  /  AlkPhos  120  11-17  TPro  9.3<H>  /  Alb  2.8<L>  /  TBili  0.5  /  DBili  x   /  AST  45<H>  /  ALT  29  /  AlkPhos  124<H>  11-16  TPro  9.3<H>  /  Alb  2.8<L>  /  TBili  0.5  /  DBili  x   /  AST  50<H>  /  ALT  33  /  AlkPhos  149<H>  11-15  TPro  9.5<H>  /  Alb  2.8<L>  /  TBili  0.4  /  DBili  x   /  AST  50<H>  /  ALT  34<H>  /  AlkPhos  151<H>  11-14    CAPILLARY BLOOD GLUCOSE          LIVER FUNCTIONS - ( 17 Nov 2018 06:00 )  Alb: 2.7 g/dL / Pro: 9.9 g/dL / ALK PHOS: 120 u/L / ALT: 26 u/L / AST: 40 u/L / GGT: x               D-Dimer Assay, Quantitative: 3347 ng/mL (11-14 @ 12:45)  Serum Pro-Brain Natriuretic Peptide: 73680 pg/mL (11-14 @ 12:45)        RECENT CULTURES:        RESPIRATORY CULTURES:    < from: CT Angio Chest w/ IV Cont (11.14.18 @ 15:14) >  HEART: The heart is enlarged. The right atrium is severely dilated.   Reflux of the contrast within the hepatic veins likely representing   right-sided heart failure. Trace pericardial effusion. Aortic valve   calcifications. No thoracic aortic aneurysm.    MEDIASTINUM AND MARVIN: No lymphadenopathy.    CHEST WALL AND LOWER NECK: Within normal limits.    VISUALIZED UPPER ABDOMEN: Within normal limits.    BONES: Mild degenerative changes.    IMPRESSION:     No pulmonary embolism. No aortic aneurysm or dissection.    Severely dilated right atrium and reflux of the contrast into the hepatic   veins representing right-sided heart failure.    Mild pulmonary edema and basilar predominant interstitial lung disease.    6 mm right upper lobe nodule. Recommend follow-up in 3 months to   determine stability.          < from: Xray Chest 2 Views PA/Lat (11.14.18 @ 14:24) >    EXAM:  XR CHEST PA LAT 2V        PROCEDURE DATE:  Nov 14 2018         INTERPRETATION:  CLINICAL INDICATION: dyspnea    EXAM:  Frontal and lateral chest from 11/14/2018 at 1424. No prior chest x-ray   available at this institution for comparison.    IMPRESSION:  Small left pleural effusion with adjacent left basilar patchy airspace   consolidation including possible infiltrate/pneumonia in the proper   clinical context. Also correlate with findings on already pending chest   CTA.    Sharp right CP angle. Clear remaining visualized lungs. No pneumothorax.    Enlarged appearing cardiac and mediastinal silhouettes. Scant aortic arch   calcifications.    Trachea midline.    Slightly osteopenic but otherwise unremarkable osseous structures.                  MADAY BOWEN M.D., ATTENDING RADIOLOGIST  This document has been electronically signed. Nov 14 2018  2:48PM        < end of copied text >          DEBBIE ELLINGTON M.D., RADIOLOGY RESIDENT  This document has been electronically signed.  MATTHEW MCKEON M.D., ATTENDING RADIOLOGIST  This document has been electronically signed. Nov 14 2018  4:10PM        < end of copied text >  < from: Transthoracic Echocardiogram (11.15.18 @ 13:08) >  Aortic Root: Normal aortic root.  Aortic Valve: Calcified trileaflet aortic valve with  decreased opening. Peak transaortic valve gradient equals  23 mm Hg, mean transaortic valve gradient equals 13 mm Hg,  estimated aortic valve area equals 1.1 sqcm (by continuity  equation), consistent with moderate aortic stenosis.  Left Atrium: Normal left atrium.  LA volume index = 20  cc/m2.  Left Ventricle: Normal left ventricular systolic function.  No segmental wall motion abnormalities. Normal left  ventricular internal dimensions and wall thicknesses.  Right Heart: Severe right atrial enlargement. Right  ventricular enlargement with decreased right ventricular  systolic function.  Systolic flattening of the  interventricular septum, consistent with RV pressure  overload. Normal tricuspid valve. Moderate tricuspid  regurgitation. Normal pulmonic valve.  Mild pulmonic  regurgitation.  Pericardium/PleuraNormal pericardium with no pericardial  effusion.  Hemodynamic: Estimated right ventricular systolic pressure  equals 65 mm Hg, assuming right atrial pressure equals 10  mm Hg, consistent with severe pulmonary hypertension.  ------------------------------------------------------------------------  CONCLUSIONS:  1. Mitral annular calcification, otherwise normal mitral  valve. Mild mitral regurgitation.  2. Calcified trileaflet aortic valve with decreased  opening. Peak transaortic valve gradient equals 23 mm Hg,  mean transaortic valve gradient equals 13 mm Hg, estimated  aortic valve area equals 1.1 sqcm (by continuity equation),  consistent with moderate aortic stenosis.  3. Normal left ventricular internal dimensions and wall  thicknesses.  4. Normal left ventricular systolic function. No segmental  wall motion abnormalities.  5. Severe right atrial enlargement.  6. Right ventricular enlargement with decreased right  ventricular systolic function.  Systolic flattening of the  interventricular septum, consistent with RV pressure  overload.  7. Estimated right ventricular systolic pressure equals 65  mm Hg, assuming right atrial pressure equals 10 mm Hg,  consistent with severe pulmonary hypertension.  *** No previous Echo exam.  ------------------------------------------------------------------------  Confirmed on  11/15/2018 - 14:19:43 by Saleem Blanton M.D.  ------------------------------------------------------------------------    < end of copied text >        Studies  Chest X-RAY  CT SCAN Chest   Venous Dopplers: LE:   CT Abdomen  Others

## 2018-11-19 NOTE — PROGRESS NOTE ADULT - PROBLEM SELECTOR PLAN 2
cath noted: patent stents
chest pain free   continue to monitor
- cardiology care appreciated  - s/p cath with medical management
TOM pending: she is not SOB: outpt PFT : cath noted: ? rig heart cath  11/18: antibodies to be sent today: She ould need full outp pft
chest pain free   continue to monitor
s/p coronary angiogram  no intervention required at this time
TOM pending: she is not SOB: outpt PFT : cath noted: ? rigth heart cath  11/18: antibodies to be sent today: She ould need full outp pft  11/19: as above: out pt pft
chest pain free   continue to monitor

## 2018-11-19 NOTE — PROGRESS NOTE ADULT - PROBLEM SELECTOR PLAN 5
TSH normal  will continue to monitor as outpatient
controlled
TSH normal  will continue to monitor as outpatient
TSH normal  will continue to monitor as outpatient
continue synthroid  TSH normal
per gi  11/18: ESTHELA SMITH NOTED!
per gi  11/18: CT LUIS NOTED!  11/19: PER PRIMARY TEAM

## 2018-11-19 NOTE — PROGRESS NOTE ADULT - PROBLEM SELECTOR PLAN 4
per gi
continue statin
- pulmonary on board/appreciated
continue statin
outp follow u p
outpatient follow up with PCP  statin
continue statin
outp follow u p

## 2018-11-19 NOTE — PROGRESS NOTE ADULT - PROBLEM SELECTOR PLAN 1
CT abd noted no brandon evidence of malignancy   no EGD/colonoscopy as patient just had MI
diet as tolerated  echo shows RV overload  await CT  high risk for any endoscopic procedures
diet as tolerated  echo shows RV overload  ct scan reviewed  no plan for endoscopic evaluation given above
- CTA Chest reviewed with attg; RHF, Interstitial lung disease and pulmonary nodule  - Interstitial lung dz may be contributing to weight loss  - pulmonary input/medicine input appreciated  - CT Abd/Pelvis without malignancy   - consider appetite stimulant  - encourage PO intake with ensure supplements TID  - no gi objection to dc planning per primary team
Mild ILD with bronchiectsis on ct scan chest: Outpt full PFT: Depending  upon the severity of lung disease: her pulm htn is contributed by ILD as well as moderate aortic stenosis: Start Symbicort: Her antibodies are not sent still: REquested to send them today :especially scleroderma antibodies
Recommend inpatient work up  CT abdomen/pelvis   occasional dysphagia  Recommend GI evaluation and EGD/colonoscopy if cardiology attending allows
awaiting CT abd/pelvis  may need furosemide GI work up if negative if cleared by cardiology attending
CT abd noted no brandon evidence of malignancy   outpatient follow up with GI
Mild ILD with bronchiectsis on ct scan chest: Outpt full PFT: Depending  upon the severity of lung disease: her pulm htn is contributed by ILD as well as moderate aortic stenosis: Start Symbicort: Her antibodies are not sent still: REquested to send them today :especially scleroderma antibodies  11/19: SCL are negative: rest pending!
TOM pending: she is not SOB: outpt PFT : cath noted: ? rigth heart cath

## 2018-11-19 NOTE — PROGRESS NOTE ADULT - PROBLEM SELECTOR PROBLEM 1
Unintentional weight loss
Unintentional weight loss
Weight loss, unintentional
Interstitial lung disease
Unintentional weight loss
Weight loss, unintentional
Weight loss, unintentional
Interstitial lung disease
Weight loss, unintentional
Pulmonary hypertension

## 2018-11-19 NOTE — CHART NOTE - NSCHARTNOTEFT_GEN_A_CORE
Case d/w attending patient stable for discharge home . - d/w pulm ok for discharge   outpt follow up

## 2018-11-19 NOTE — PROGRESS NOTE ADULT - PROBLEM SELECTOR PLAN 3
outp follow u p
acceptable  continue meds with hold parameters
- suspect increase in LFTs d/t hepatic congestions 2/2 RHF  - further care per cardiology
acceptable  continue meds with hold parameters
cath noted: patent stents  11/18: STABLE: NO CHEST PAIN NOW:
continue meds  will continue to monitor
acceptable  continue meds with hold parameters
cath noted: patent stents  11/18: STABLE: NO CHEST PAIN NOW:  11/19: STABLE~!

## 2018-11-19 NOTE — DISCHARGE NOTE ADULT - HOSPITAL COURSE
72 y/o F w/ PMH of HTN, Hypothyroidism, HLD, and CAD (s/p stent x2 10/5/18) presenting w/ worsening chest pain x3 days. Pt also reports a 35lb weight loss over 11 months.    +NSTEMI- cath on 11/15- 11/15 LHC: prox/mid LAD 20%, pLCx 50%, mOM2 50%, mRCA 40%, mRCA patent stent, dRCA patent stent, EF 60%, LVEDP 4, RFA accessed - site ok    +CHF - Lasix d/c'd    GI consulted for 35lb Wt loss - , CT abd/pelvis- no mass 11/17 CT abd/pel: The heterogeneous morphology liver (nutmeg liver) can be secondary to right heart dysfunction. No bowel obstruction. Bilateral lower lobe reticular opacities and bronchiectasis can indicate underlying interstitial lung disease. This was better   characterized on the CT chest of 11/14/2018.  Left pleural effusion.    +pHTN/ILD - pulm following

## 2018-11-19 NOTE — DISCHARGE NOTE ADULT - PLAN OF CARE
To be asymptomatic, to reduce risks factors such as hypertension, diabetes and hyperlipidemia to lower the risk of blood clots formation; and to prevent complications of coronary artery disease such as worsening chest pain, heart attack and death. Continue aspirin and Plavix, do not stop unless instructed by your physician.  Continue low salt, fat, cholesterol and carbohydrate diet. Follow up with cardiologist and primary care physician's routine appointment. To relieve and prevent worsening symptoms associated with congestive heart failure, to improve quality of life, and to treat underlying conditions such as coronary heart disease, high blood pressure, or diabetes, and to maintain euvolemia. Low salt diet, fluid restriction to 1500 ml daily, monitor your fluid intake and weight daily, exercise as tolerated 30 minutes daily, and follow up with your physician within 1 to 2 weeks. To maintain normal cholesterol levels to prevent stroke, coronary artery disease, peripheral vascular disease and heart attacks. Low fat diet, exercise daily and continue current medications. Follow up with primary care physician and cardiologist for management. To maintain a normal blood pressure to prevent heart attack, stroke and renal failure. Low sodium and fat diet, continue anti-hypertensive medications, and follow up with primary care physician.

## 2018-11-19 NOTE — PROGRESS NOTE ADULT - PROBLEM SELECTOR PROBLEM 2
NSTEMI (non-ST elevated myocardial infarction)
Pulmonary hypertension
NSTEMI (non-ST elevated myocardial infarction)
Pulmonary hypertension

## 2018-11-19 NOTE — PROGRESS NOTE ADULT - PROBLEM SELECTOR PROBLEM 4
Weight loss, unintentional
Mixed hyperlipidemia
Interstitial lung disease
Mixed hyperlipidemia
Mixed hyperlipidemia
Pulmonary nodule
Mixed hyperlipidemia
Pulmonary nodule

## 2018-11-19 NOTE — DISCHARGE NOTE ADULT - SECONDARY DIAGNOSIS.
Acute right-sided heart failure Mixed hyperlipidemia Hypothyroidism, unspecified type Essential hypertension CAD (coronary artery disease)

## 2018-11-19 NOTE — PROGRESS NOTE ADULT - PROVIDER SPECIALTY LIST ADULT
Cardiology
Gastroenterology
Internal Medicine
Pulmonology
Cardiology
Pulmonology
Internal Medicine
Pulmonology

## 2018-11-20 LAB — ENA SCL70 AB SER-ACNC: 0.2 — SIGNIFICANT CHANGE UP

## 2018-11-22 LAB
ANA PAT FLD IF-IMP: SIGNIFICANT CHANGE UP
ANA PAT FLD IF-IMP: SIGNIFICANT CHANGE UP
ANA TITR SER: SIGNIFICANT CHANGE UP
ANA TITR SER: SIGNIFICANT CHANGE UP

## 2018-11-26 PROBLEM — I25.10 ATHEROSCLEROTIC HEART DISEASE OF NATIVE CORONARY ARTERY WITHOUT ANGINA PECTORIS: Chronic | Status: ACTIVE | Noted: 2018-11-14

## 2018-11-26 PROBLEM — I10 ESSENTIAL (PRIMARY) HYPERTENSION: Chronic | Status: ACTIVE | Noted: 2018-11-14

## 2018-11-26 PROBLEM — E78.2 MIXED HYPERLIPIDEMIA: Chronic | Status: ACTIVE | Noted: 2018-11-14

## 2018-11-26 PROBLEM — Z00.00 ENCOUNTER FOR PREVENTIVE HEALTH EXAMINATION: Status: ACTIVE | Noted: 2018-11-26

## 2018-11-26 PROBLEM — E03.9 HYPOTHYROIDISM, UNSPECIFIED: Chronic | Status: ACTIVE | Noted: 2018-11-14

## 2018-11-29 ENCOUNTER — APPOINTMENT (OUTPATIENT)
Dept: HOME HEALTH SERVICES | Facility: HOME HEALTH | Age: 73
End: 2018-11-29
Payer: MEDICARE

## 2018-11-29 VITALS — HEIGHT: 62 IN | BODY MASS INDEX: 16.28 KG/M2 | WEIGHT: 88.5 LBS

## 2018-11-29 VITALS
RESPIRATION RATE: 18 BRPM | TEMPERATURE: 98.7 F | OXYGEN SATURATION: 94 % | HEART RATE: 111 BPM | DIASTOLIC BLOOD PRESSURE: 60 MMHG | SYSTOLIC BLOOD PRESSURE: 100 MMHG

## 2018-11-29 DIAGNOSIS — Z95.5 PRESENCE OF CORONARY ANGIOPLASTY IMPLANT AND GRAFT: ICD-10-CM

## 2018-11-29 DIAGNOSIS — Z86.79 PERSONAL HISTORY OF OTHER DISEASES OF THE CIRCULATORY SYSTEM: ICD-10-CM

## 2018-11-29 DIAGNOSIS — R26.81 UNSTEADINESS ON FEET: ICD-10-CM

## 2018-11-29 DIAGNOSIS — R06.02 SHORTNESS OF BREATH: ICD-10-CM

## 2018-11-29 DIAGNOSIS — Z71.89 OTHER SPECIFIED COUNSELING: ICD-10-CM

## 2018-11-29 DIAGNOSIS — E78.5 HYPERLIPIDEMIA, UNSPECIFIED: ICD-10-CM

## 2018-11-29 DIAGNOSIS — Z86.39 PERSONAL HISTORY OF OTHER ENDOCRINE, NUTRITIONAL AND METABOLIC DISEASE: ICD-10-CM

## 2018-11-29 PROCEDURE — 99350 HOME/RES VST EST HIGH MDM 60: CPT | Mod: 25

## 2018-11-29 PROCEDURE — 99497 ADVNCD CARE PLAN 30 MIN: CPT

## 2018-11-29 PROCEDURE — G0506: CPT

## 2018-11-29 RX ORDER — ASPIRIN ENTERIC COATED TABLETS 81 MG 81 MG/1
81 TABLET, DELAYED RELEASE ORAL
Refills: 0 | Status: ACTIVE | COMMUNITY
Start: 2018-11-29

## 2018-11-29 RX ORDER — SIMVASTATIN 40 MG/1
40 TABLET, FILM COATED ORAL
Refills: 0 | Status: ACTIVE | COMMUNITY
Start: 2018-11-29

## 2018-11-30 DIAGNOSIS — R91.1 SOLITARY PULMONARY NODULE: ICD-10-CM

## 2018-12-06 PROBLEM — R26.81 UNSTEADY GAIT: Status: ACTIVE | Noted: 2018-12-06

## 2018-12-11 ENCOUNTER — MOBILE ON CALL (OUTPATIENT)
Age: 73
End: 2018-12-11

## 2019-01-01 NOTE — H&P ADULT - PROBLEM SELECTOR PROBLEM 1
Adequate: hears normal conversation without difficulty NSTEMI (non-ST elevated myocardial infarction)

## 2019-01-03 ENCOUNTER — MOBILE ON CALL (OUTPATIENT)
Age: 74
End: 2019-01-03

## 2019-01-22 ENCOUNTER — APPOINTMENT (OUTPATIENT)
Dept: PULMONOLOGY | Facility: CLINIC | Age: 74
End: 2019-01-22
Payer: MEDICARE

## 2019-01-22 PROCEDURE — 94726 PLETHYSMOGRAPHY LUNG VOLUMES: CPT

## 2019-01-22 PROCEDURE — 94060 EVALUATION OF WHEEZING: CPT

## 2019-01-22 PROCEDURE — 94729 DIFFUSING CAPACITY: CPT

## 2019-01-24 ENCOUNTER — APPOINTMENT (OUTPATIENT)
Dept: HOME HEALTH SERVICES | Facility: HOME HEALTH | Age: 74
End: 2019-01-24
Payer: MEDICARE

## 2019-01-24 VITALS
HEIGHT: 62 IN | RESPIRATION RATE: 18 BRPM | HEART RATE: 100 BPM | OXYGEN SATURATION: 95 % | TEMPERATURE: 98 F | SYSTOLIC BLOOD PRESSURE: 100 MMHG | BODY MASS INDEX: 15.83 KG/M2 | WEIGHT: 86 LBS | DIASTOLIC BLOOD PRESSURE: 78 MMHG

## 2019-01-24 PROCEDURE — 99349 HOME/RES VST EST MOD MDM 40: CPT

## 2019-01-24 RX ORDER — METOPROLOL TARTRATE 25 MG/1
25 TABLET, FILM COATED ORAL
Refills: 0 | Status: DISCONTINUED | COMMUNITY
Start: 2018-11-29 | End: 2019-01-24

## 2019-01-24 RX ORDER — FUROSEMIDE 20 MG/1
20 TABLET ORAL DAILY
Refills: 0 | Status: DISCONTINUED | COMMUNITY
Start: 2018-11-29 | End: 2019-01-24

## 2019-01-24 NOTE — CHRONIC CARE ASSESSMENT
[Current] : Current Pain: [0] : currently ~his/her~ pain is 0 out of 10 [Oriented To Person] : ~L oriented to person [Oriented To Place] : ~L oriented to place [Oriented To Time] : ~L oriented to time [Alert] : ~L alert

## 2019-01-28 ENCOUNTER — LABORATORY RESULT (OUTPATIENT)
Age: 74
End: 2019-01-28

## 2019-01-29 LAB
ALBUMIN SERPL ELPH-MCNC: 2.9 G/DL
ALP BLD-CCNC: 74 U/L
ALT SERPL-CCNC: 29 U/L
ANION GAP SERPL CALC-SCNC: 14 MMOL/L
AST SERPL-CCNC: 34 U/L
BASOPHILS # BLD AUTO: 0.03 K/UL
BASOPHILS NFR BLD AUTO: 0.5 %
BILIRUB SERPL-MCNC: 0.5 MG/DL
BUN SERPL-MCNC: 29 MG/DL
CALCIUM SERPL-MCNC: 9.3 MG/DL
CHLORIDE SERPL-SCNC: 99 MMOL/L
CHOLEST SERPL-MCNC: 123 MG/DL
CHOLEST/HDLC SERPL: 3.6 RATIO
CO2 SERPL-SCNC: 25 MMOL/L
CREAT SERPL-MCNC: 0.74 MG/DL
EOSINOPHIL # BLD AUTO: 0.26 K/UL
EOSINOPHIL NFR BLD AUTO: 4.2 %
HCT VFR BLD CALC: 38.4 %
HDLC SERPL-MCNC: 34 MG/DL
HGB BLD-MCNC: 11.8 G/DL
IMM GRANULOCYTES NFR BLD AUTO: 0.3 %
LDLC SERPL CALC-MCNC: 60 MG/DL
LYMPHOCYTES # BLD AUTO: 1.15 K/UL
LYMPHOCYTES NFR BLD AUTO: 18.7 %
MAN DIFF?: NORMAL
MCHC RBC-ENTMCNC: 27.1 PG
MCHC RBC-ENTMCNC: 30.7 GM/DL
MCV RBC AUTO: 88.1 FL
MONOCYTES # BLD AUTO: 0.4 K/UL
MONOCYTES NFR BLD AUTO: 6.5 %
NEUTROPHILS # BLD AUTO: 4.29 K/UL
NEUTROPHILS NFR BLD AUTO: 69.8 %
PLATELET # BLD AUTO: 194 K/UL
POTASSIUM SERPL-SCNC: 4.2 MMOL/L
PROT SERPL-MCNC: 9 G/DL
RBC # BLD: 4.36 M/UL
RBC # FLD: 17.9 %
SODIUM SERPL-SCNC: 138 MMOL/L
TRIGL SERPL-MCNC: 147 MG/DL
TSH SERPL-ACNC: 0.03 UIU/ML
WBC # FLD AUTO: 6.15 K/UL

## 2019-02-01 NOTE — PHYSICAL EXAM
[No Acute Distress] : no acute distress [Well Nourished] : well nourished [Normal Outer Ear/Nose] : the ears and nose were normal in appearance [No JVD] : no jugular venous distention [Normal Rate] : heart rate was normal  [Regular Rhythm] : with a regular rhythm [de-identified] : slight KELLEY, crackles to RT lower base

## 2019-02-01 NOTE — HISTORY OF PRESENT ILLNESS
[Patient] : patient [FreeTextEntry2] : 73 year old female with HTN, Hypothyroidism, HLD, and CAD (s/p stent x2 10/5/18). +NSTEMI- cath on 11/15- 11/15 [facility]: prox/mid LAD 20%, pLCx 50%, mOM2 50%, mRCA 40%, mRCA patent stent, dRCA patent stent, EF 60%, LVEDP 4, RFA accessed \par \par Seen for acute visit weight loss and dizziness. \par \par Patient has been loosing weight in the last 6 months, GI work up done in hospital. \par \par +CHF - Lasix d/cd  and changed to HCTZ\par Will get labs check TSH. \par \par \par

## 2019-02-01 NOTE — COUNSELING
[Underweight (BMI < 18.5)] : Underweight (BMI < 18.5) [Non - Smoker] : non-smoker [Use assistive device to avoid falls] : use assistive device to avoid falls [Mammogram] : Mammogram [Improve exercise tolerance] : improve exercise tolerance [Improve mobility] : improve mobility [FreeTextEntry5] : mammogram 10/2018

## 2019-02-07 ENCOUNTER — APPOINTMENT (OUTPATIENT)
Dept: HOME HEALTH SERVICES | Facility: HOME HEALTH | Age: 74
End: 2019-02-07
Payer: MEDICARE

## 2019-02-07 VITALS
SYSTOLIC BLOOD PRESSURE: 120 MMHG | TEMPERATURE: 97.6 F | DIASTOLIC BLOOD PRESSURE: 79 MMHG | RESPIRATION RATE: 16 BRPM | HEART RATE: 88 BPM | OXYGEN SATURATION: 96 %

## 2019-02-07 DIAGNOSIS — I27.20 PULMONARY HYPERTENSION, UNSPECIFIED: ICD-10-CM

## 2019-02-07 DIAGNOSIS — R63.0 ANOREXIA: ICD-10-CM

## 2019-02-07 DIAGNOSIS — F32.0 MAJOR DEPRESSIVE DISORDER, SINGLE EPISODE, MILD: ICD-10-CM

## 2019-02-07 DIAGNOSIS — I50.9 HEART FAILURE, UNSPECIFIED: ICD-10-CM

## 2019-02-07 DIAGNOSIS — I50.810 RIGHT HEART FAILURE, UNSPECIFIED: ICD-10-CM

## 2019-02-07 DIAGNOSIS — I70.0 ATHEROSCLEROSIS OF AORTA: ICD-10-CM

## 2019-02-07 DIAGNOSIS — I50.812 CHRONIC RIGHT HEART FAILURE: ICD-10-CM

## 2019-02-07 PROCEDURE — 99349 HOME/RES VST EST MOD MDM 40: CPT

## 2019-02-07 NOTE — LETTER HEADER
[Care Plan reviewed and provided to patient/caregiver] : Care plan reviewed and provided to patient/caregiver [Care Plan managed/Care coordinated by: ___] : Care plan managed/Care coordinated by: [unfilled] [Patient/Caregiver agrees to have other providers send summary of their care to this office] : Patient/caregiver agrees to have other providers send summary of their care to this office [Care Plan reviewed every ___ weeks] : Care plan reviewed every [unfilled] weeks

## 2019-02-07 NOTE — REASON FOR VISIT
[Initial Eval - Existing Diagnosis] : an initial evaluation of an existing diagnosis [FreeTextEntry1] : HTN, Hypothyroidism, HLD, and CAD (s/p stent x2 10/5/18)  [FreeTextEntry3] : [employer]

## 2019-02-07 NOTE — HISTORY OF PRESENT ILLNESS
[Patient] : patient [FreeTextEntry2] : 73 year old female  with HTN, Hypothyroidism, HLD, and CAD (s/p stent x2 10/5/18). +NSTEMI- cath on 11/15- 11/15 [facility]: prox/mid LAD 20%, pLCx 50%, mOM2 50%, mRCA 40%, mRCA patent stent, dRCA patent stent, EF 60%, LVEDP 4, RFA accessed \par \par Seen for follow up for chronic conditions\par  Patient was seen last month for weight loss and dizziness, labs were checked, noted low TSH and elevated t4, Synthroid was decreased to 100mcg. \par \par Patient still reports poor appetite and is requesting appetite stimulant. Family reports patient is frequently angry and depressed as well. \par Will try Remeron 7.5mg. For major depression and appetite stimulant. \par

## 2019-02-07 NOTE — PHYSICAL EXAM
[No Acute Distress] : no acute distress [Normal Outer Ear/Nose] : the ears and nose were normal in appearance [No JVD] : no jugular venous distention [Normal Rate] : heart rate was normal  [Regular Rhythm] : with a regular rhythm [Normal Supraclavicular Nodes] : no supraclavicular lymphadenopathy [Normal Gait] : normal gait [No Joint Swelling] : no joint swelling seen [No Rash] : no rash [No Skin Lesions] : no skin lesions [Cranial Nerves Intact] : cranial nerves 2-12 were intact [No Motor Deficits] : the motor exam was normal [Oriented x3] : oriented to person, place, and time [de-identified] : very thin and frail

## 2019-03-01 ENCOUNTER — APPOINTMENT (OUTPATIENT)
Dept: HOME HEALTH SERVICES | Facility: HOME HEALTH | Age: 74
End: 2019-03-01

## 2019-03-01 VITALS
RESPIRATION RATE: 18 BRPM | SYSTOLIC BLOOD PRESSURE: 100 MMHG | TEMPERATURE: 97.4 F | HEART RATE: 68 BPM | DIASTOLIC BLOOD PRESSURE: 70 MMHG | OXYGEN SATURATION: 97 %

## 2019-03-01 RX ORDER — CLOPIDOGREL BISULFATE 75 MG/1
75 TABLET, FILM COATED ORAL DAILY
Qty: 1 | Refills: 0 | Status: ACTIVE | COMMUNITY
Start: 2018-11-29 | End: 1900-01-01

## 2019-03-18 RX ORDER — LEVOTHYROXINE SODIUM 0.09 MG/1
88 TABLET ORAL
Refills: 0 | Status: ACTIVE | COMMUNITY
Start: 2018-11-29

## 2019-03-25 ENCOUNTER — APPOINTMENT (OUTPATIENT)
Dept: HOME HEALTH SERVICES | Facility: HOME HEALTH | Age: 74
End: 2019-03-25

## 2019-03-27 ENCOUNTER — APPOINTMENT (OUTPATIENT)
Dept: HOME HEALTH SERVICES | Facility: HOME HEALTH | Age: 74
End: 2019-03-27

## 2019-05-06 ENCOUNTER — APPOINTMENT (OUTPATIENT)
Dept: HOME HEALTH SERVICES | Facility: HOME HEALTH | Age: 74
End: 2019-05-06
Payer: MEDICARE

## 2019-05-06 VITALS — WEIGHT: 90.6 LBS | BODY MASS INDEX: 16.57 KG/M2 | DIASTOLIC BLOOD PRESSURE: 40 MMHG | SYSTOLIC BLOOD PRESSURE: 80 MMHG

## 2019-05-06 DIAGNOSIS — E43 UNSPECIFIED SEVERE PROTEIN-CALORIE MALNUTRITION: ICD-10-CM

## 2019-05-06 DIAGNOSIS — I25.10 ATHEROSCLEROTIC HEART DISEASE OF NATIVE CORONARY ARTERY W/OUT ANGINA PECTORIS: ICD-10-CM

## 2019-05-06 DIAGNOSIS — E46 UNSPECIFIED PROTEIN-CALORIE MALNUTRITION: ICD-10-CM

## 2019-05-06 DIAGNOSIS — J84.9 INTERSTITIAL PULMONARY DISEASE, UNSPECIFIED: ICD-10-CM

## 2019-05-06 DIAGNOSIS — J47.9 BRONCHIECTASIS, UNCOMPLICATED: ICD-10-CM

## 2019-05-06 DIAGNOSIS — E03.9 HYPOTHYROIDISM, UNSPECIFIED: ICD-10-CM

## 2019-05-06 PROCEDURE — 99349 HOME/RES VST EST MOD MDM 40: CPT

## 2019-05-06 RX ORDER — BUDESONIDE AND FORMOTEROL FUMARATE DIHYDRATE 160; 4.5 UG/1; UG/1
160-4.5 AEROSOL RESPIRATORY (INHALATION)
Refills: 0 | Status: DISCONTINUED | COMMUNITY
Start: 2018-11-29 | End: 2019-05-06

## 2019-05-06 RX ORDER — MIRTAZAPINE 7.5 MG/1
7.5 TABLET, FILM COATED ORAL
Qty: 30 | Refills: 0 | Status: COMPLETED | COMMUNITY
Start: 2019-02-07 | End: 2019-05-06

## 2019-05-06 RX ORDER — HYDROCHLOROTHIAZIDE 25 MG/1
25 TABLET ORAL DAILY
Qty: 30 | Refills: 3 | Status: DISCONTINUED | COMMUNITY
Start: 2019-01-24 | End: 2019-05-06

## 2019-05-06 RX ORDER — METOPROLOL SUCCINATE 25 MG/1
25 TABLET, EXTENDED RELEASE ORAL DAILY
Refills: 3 | Status: ACTIVE | COMMUNITY
Start: 2019-05-06

## 2019-05-06 NOTE — CHRONIC CARE ASSESSMENT
[Current] : Current Pain: [0] : currently ~his/her~ pain is 0 out of 10 [Oriented To Person] : ~L oriented to person [Oriented To Place] : ~L oriented to place [Alert] : ~L alert [Oriented To Time] : ~L oriented to time

## 2019-05-06 NOTE — COUNSELING
[Underweight (BMI < 18.5)] : Underweight (BMI < 18.5) [Non - Smoker] : non-smoker [Improve exercise tolerance] : improve exercise tolerance [Use assistive device to avoid falls] : use assistive device to avoid falls [Mammogram] : Mammogram [Improve mobility] : improve mobility [FreeTextEntry5] : mammogram 10/2018

## 2019-05-06 NOTE — HISTORY OF PRESENT ILLNESS
[Patient] : patient [FreeTextEntry2] : 73 year old female  with HTN, Hypothyroidism, HLD, and CAD (s/p stent x2 10/5/18). +NSTEMI- cath on 11/15- 11/15 [facility]: prox/mid LAD 20%, pLCx 50%, mOM2 50%, mRCA 40%, mRCA patent stent, dRCA patent stent, EF 60%, LVEDP 4, RFA accessed \par \par Hypothyroid- Synthroid was decreased to 88 mcg. Due to have labs repeated\par Depression and poor appetite. Had 35 lb weight loss.  started on Remeron 7.5mg and took for 2 months, then self d/c'd.\par CAD- hx NSTEMI and stent 10/2018 . on asa and plavix. cardio started on metoprolol 25 3 weeks ago. also on HCTZ 25 for HTN

## 2019-05-06 NOTE — PHYSICAL EXAM
[No Acute Distress] : no acute distress [Normal Outer Ear/Nose] : the ears and nose were normal in appearance [Normal Rate] : heart rate was normal  [No JVD] : no jugular venous distention [Normal Supraclavicular Nodes] : no supraclavicular lymphadenopathy [Regular Rhythm] : with a regular rhythm [Normal Gait] : normal gait [No Skin Lesions] : no skin lesions [No Joint Swelling] : no joint swelling seen [No Rash] : no rash [Oriented x3] : oriented to person, place, and time [No Motor Deficits] : the motor exam was normal [Cranial Nerves Intact] : cranial nerves 2-12 were intact [de-identified] : thin

## 2019-05-06 NOTE — LETTER HEADER
[Care Plan reviewed and provided to patient/caregiver] : Care plan reviewed and provided to patient/caregiver [Patient/Caregiver agrees to have other providers send summary of their care to this office] : Patient/caregiver agrees to have other providers send summary of their care to this office [Care Plan reviewed every ___ weeks] : Care plan reviewed every [unfilled] weeks [Care Plan managed/Care coordinated by: ___] : Care plan managed/Care coordinated by: [unfilled] [Initiation or substantial revisions made to care plan involving mod/high medical decision making for complex CCM] : Initiation or substantial revisions made to care plan involving mod/high medical decision making for complex CCM

## 2020-01-24 NOTE — ASSESSMENT
[FreeTextEntry1] : Pt no longer on [employer] insurance. new c[employer] is [employer]\par Pt with f/u with PCP and will d/c from house calls
home

## 2020-04-21 NOTE — ED ADULT NURSE NOTE - CHIEF COMPLAINT QUOTE
21 y.o. female presents to ER RWR 01/RWR 01   Chief Complaint   Patient presents with    Shortness of Breath   pt c/o SOB, cough, & fever. No acute distress noted.    
Pt c/o SOB since yesterday that has worsened with CP and dizziness that worsens with exertion. Pt had 2 stents placed in October 5th. Appears SOB.